# Patient Record
Sex: MALE | Race: WHITE | NOT HISPANIC OR LATINO | Employment: FULL TIME | URBAN - METROPOLITAN AREA
[De-identification: names, ages, dates, MRNs, and addresses within clinical notes are randomized per-mention and may not be internally consistent; named-entity substitution may affect disease eponyms.]

---

## 2019-03-09 ENCOUNTER — APPOINTMENT (OUTPATIENT)
Dept: CT IMAGING | Facility: CLINIC | Age: 39
End: 2019-03-09
Attending: EMERGENCY MEDICINE
Payer: COMMERCIAL

## 2019-03-09 ENCOUNTER — HOSPITAL ENCOUNTER (INPATIENT)
Facility: CLINIC | Age: 39
LOS: 2 days | Discharge: HOME HEALTH | End: 2019-03-11
Attending: EMERGENCY MEDICINE | Admitting: OTOLARYNGOLOGY
Payer: COMMERCIAL

## 2019-03-09 DIAGNOSIS — H70.91 MASTOIDITIS OF RIGHT SIDE: ICD-10-CM

## 2019-03-09 DIAGNOSIS — G51.0 BELL'S PALSY: ICD-10-CM

## 2019-03-09 LAB
ANION GAP SERPL CALCULATED.3IONS-SCNC: 8 MMOL/L (ref 3–14)
BASOPHILS # BLD AUTO: 0 10E9/L (ref 0–0.2)
BASOPHILS NFR BLD AUTO: 0.6 %
BUN SERPL-MCNC: 13 MG/DL (ref 7–30)
CALCIUM SERPL-MCNC: 8.7 MG/DL (ref 8.5–10.1)
CHLORIDE SERPL-SCNC: 107 MMOL/L (ref 94–109)
CO2 SERPL-SCNC: 26 MMOL/L (ref 20–32)
CREAT SERPL-MCNC: 0.92 MG/DL (ref 0.66–1.25)
DIFFERENTIAL METHOD BLD: NORMAL
EOSINOPHIL # BLD AUTO: 0.4 10E9/L (ref 0–0.7)
EOSINOPHIL NFR BLD AUTO: 6 %
ERYTHROCYTE [DISTWIDTH] IN BLOOD BY AUTOMATED COUNT: 12.3 % (ref 10–15)
GFR SERPL CREATININE-BSD FRML MDRD: >90 ML/MIN/{1.73_M2}
GLUCOSE SERPL-MCNC: 100 MG/DL (ref 70–99)
GRAM STN SPEC: ABNORMAL
GRAM STN SPEC: ABNORMAL
HCT VFR BLD AUTO: 44.4 % (ref 40–53)
HGB BLD-MCNC: 15.2 G/DL (ref 13.3–17.7)
IMM GRANULOCYTES # BLD: 0 10E9/L (ref 0–0.4)
IMM GRANULOCYTES NFR BLD: 0.3 %
LYMPHOCYTES # BLD AUTO: 2.4 10E9/L (ref 0.8–5.3)
LYMPHOCYTES NFR BLD AUTO: 34.6 %
MCH RBC QN AUTO: 30.2 PG (ref 26.5–33)
MCHC RBC AUTO-ENTMCNC: 34.2 G/DL (ref 31.5–36.5)
MCV RBC AUTO: 88 FL (ref 78–100)
MONOCYTES # BLD AUTO: 0.6 10E9/L (ref 0–1.3)
MONOCYTES NFR BLD AUTO: 8.9 %
NEUTROPHILS # BLD AUTO: 3.4 10E9/L (ref 1.6–8.3)
NEUTROPHILS NFR BLD AUTO: 49.6 %
NRBC # BLD AUTO: 0 10*3/UL
NRBC BLD AUTO-RTO: 0 /100
PLATELET # BLD AUTO: 361 10E9/L (ref 150–450)
POTASSIUM SERPL-SCNC: 3.9 MMOL/L (ref 3.4–5.3)
RBC # BLD AUTO: 5.03 10E12/L (ref 4.4–5.9)
SODIUM SERPL-SCNC: 141 MMOL/L (ref 133–144)
SPECIMEN SOURCE: ABNORMAL
WBC # BLD AUTO: 6.9 10E9/L (ref 4–11)

## 2019-03-09 PROCEDURE — 99285 EMERGENCY DEPT VISIT HI MDM: CPT | Mod: Z6 | Performed by: EMERGENCY MEDICINE

## 2019-03-09 PROCEDURE — 87070 CULTURE OTHR SPECIMN AEROBIC: CPT | Performed by: STUDENT IN AN ORGANIZED HEALTH CARE EDUCATION/TRAINING PROGRAM

## 2019-03-09 PROCEDURE — 25000125 ZZHC RX 250: Performed by: STUDENT IN AN ORGANIZED HEALTH CARE EDUCATION/TRAINING PROGRAM

## 2019-03-09 PROCEDURE — 25000132 ZZH RX MED GY IP 250 OP 250 PS 637: Performed by: STUDENT IN AN ORGANIZED HEALTH CARE EDUCATION/TRAINING PROGRAM

## 2019-03-09 PROCEDURE — 96375 TX/PRO/DX INJ NEW DRUG ADDON: CPT | Performed by: EMERGENCY MEDICINE

## 2019-03-09 PROCEDURE — 85025 COMPLETE CBC W/AUTO DIFF WBC: CPT | Performed by: EMERGENCY MEDICINE

## 2019-03-09 PROCEDURE — 87106 FUNGI IDENTIFICATION YEAST: CPT | Performed by: STUDENT IN AN ORGANIZED HEALTH CARE EDUCATION/TRAINING PROGRAM

## 2019-03-09 PROCEDURE — 86140 C-REACTIVE PROTEIN: CPT | Performed by: EMERGENCY MEDICINE

## 2019-03-09 PROCEDURE — 99285 EMERGENCY DEPT VISIT HI MDM: CPT | Mod: 25 | Performed by: EMERGENCY MEDICINE

## 2019-03-09 PROCEDURE — 96365 THER/PROPH/DIAG IV INF INIT: CPT | Mod: 59 | Performed by: EMERGENCY MEDICINE

## 2019-03-09 PROCEDURE — 87077 CULTURE AEROBIC IDENTIFY: CPT | Performed by: STUDENT IN AN ORGANIZED HEALTH CARE EDUCATION/TRAINING PROGRAM

## 2019-03-09 PROCEDURE — 25000128 H RX IP 250 OP 636: Performed by: STUDENT IN AN ORGANIZED HEALTH CARE EDUCATION/TRAINING PROGRAM

## 2019-03-09 PROCEDURE — 12000001 ZZH R&B MED SURG/OB UMMC

## 2019-03-09 PROCEDURE — 25800030 ZZH RX IP 258 OP 636: Performed by: EMERGENCY MEDICINE

## 2019-03-09 PROCEDURE — 87075 CULTR BACTERIA EXCEPT BLOOD: CPT | Performed by: STUDENT IN AN ORGANIZED HEALTH CARE EDUCATION/TRAINING PROGRAM

## 2019-03-09 PROCEDURE — 87205 SMEAR GRAM STAIN: CPT | Performed by: STUDENT IN AN ORGANIZED HEALTH CARE EDUCATION/TRAINING PROGRAM

## 2019-03-09 PROCEDURE — 87186 SC STD MICRODIL/AGAR DIL: CPT | Performed by: STUDENT IN AN ORGANIZED HEALTH CARE EDUCATION/TRAINING PROGRAM

## 2019-03-09 PROCEDURE — 70482 CT ORBIT/EAR/FOSSA W/O&W/DYE: CPT

## 2019-03-09 PROCEDURE — 25000128 H RX IP 250 OP 636: Performed by: EMERGENCY MEDICINE

## 2019-03-09 PROCEDURE — 80048 BASIC METABOLIC PNL TOTAL CA: CPT | Performed by: EMERGENCY MEDICINE

## 2019-03-09 PROCEDURE — 87102 FUNGUS ISOLATION CULTURE: CPT | Performed by: STUDENT IN AN ORGANIZED HEALTH CARE EDUCATION/TRAINING PROGRAM

## 2019-03-09 PROCEDURE — 70450 CT HEAD/BRAIN W/O DYE: CPT

## 2019-03-09 RX ORDER — LIDOCAINE 40 MG/G
CREAM TOPICAL
Status: DISCONTINUED | OUTPATIENT
Start: 2019-03-09 | End: 2019-03-11 | Stop reason: HOSPADM

## 2019-03-09 RX ORDER — ACETAMINOPHEN 325 MG/1
975 TABLET ORAL EVERY 8 HOURS
Status: DISCONTINUED | OUTPATIENT
Start: 2019-03-09 | End: 2019-03-11 | Stop reason: HOSPADM

## 2019-03-09 RX ORDER — PREDNISONE 20 MG/1
60 TABLET ORAL EVERY MORNING
Status: DISCONTINUED | OUTPATIENT
Start: 2019-03-10 | End: 2019-03-11 | Stop reason: HOSPADM

## 2019-03-09 RX ORDER — ONDANSETRON 4 MG/1
4 TABLET, ORALLY DISINTEGRATING ORAL EVERY 6 HOURS PRN
Status: DISCONTINUED | OUTPATIENT
Start: 2019-03-09 | End: 2019-03-11 | Stop reason: HOSPADM

## 2019-03-09 RX ORDER — IBUPROFEN 600 MG/1
600 TABLET, FILM COATED ORAL EVERY 6 HOURS PRN
Status: ON HOLD | COMMUNITY
End: 2019-03-11

## 2019-03-09 RX ORDER — CEFTRIAXONE 1 G/1
1 INJECTION, POWDER, FOR SOLUTION INTRAMUSCULAR; INTRAVENOUS EVERY 24 HOURS
Status: DISCONTINUED | OUTPATIENT
Start: 2019-03-10 | End: 2019-03-11

## 2019-03-09 RX ORDER — IOPAMIDOL 755 MG/ML
75 INJECTION, SOLUTION INTRAVASCULAR ONCE
Status: COMPLETED | OUTPATIENT
Start: 2019-03-09 | End: 2019-03-09

## 2019-03-09 RX ORDER — IBUPROFEN 200 MG
400 TABLET ORAL EVERY 6 HOURS PRN
Status: DISCONTINUED | OUTPATIENT
Start: 2019-03-09 | End: 2019-03-11 | Stop reason: HOSPADM

## 2019-03-09 RX ORDER — ACETAMINOPHEN 325 MG/1
650 TABLET ORAL EVERY 4 HOURS PRN
Status: DISCONTINUED | OUTPATIENT
Start: 2019-03-12 | End: 2019-03-11 | Stop reason: HOSPADM

## 2019-03-09 RX ORDER — AMOXICILLIN 250 MG
2 CAPSULE ORAL 2 TIMES DAILY
Status: DISCONTINUED | OUTPATIENT
Start: 2019-03-09 | End: 2019-03-11 | Stop reason: HOSPADM

## 2019-03-09 RX ORDER — HYDROMORPHONE HYDROCHLORIDE 1 MG/ML
.3-.5 INJECTION, SOLUTION INTRAMUSCULAR; INTRAVENOUS; SUBCUTANEOUS
Status: DISCONTINUED | OUTPATIENT
Start: 2019-03-09 | End: 2019-03-10

## 2019-03-09 RX ORDER — CEFDINIR 300 MG/1
300 CAPSULE ORAL 2 TIMES DAILY
Status: ON HOLD | COMMUNITY
End: 2019-03-11

## 2019-03-09 RX ORDER — NALOXONE HYDROCHLORIDE 0.4 MG/ML
.1-.4 INJECTION, SOLUTION INTRAMUSCULAR; INTRAVENOUS; SUBCUTANEOUS
Status: DISCONTINUED | OUTPATIENT
Start: 2019-03-09 | End: 2019-03-11 | Stop reason: HOSPADM

## 2019-03-09 RX ORDER — SODIUM CHLORIDE, SODIUM LACTATE, POTASSIUM CHLORIDE, CALCIUM CHLORIDE 600; 310; 30; 20 MG/100ML; MG/100ML; MG/100ML; MG/100ML
INJECTION, SOLUTION INTRAVENOUS CONTINUOUS
Status: DISCONTINUED | OUTPATIENT
Start: 2019-03-09 | End: 2019-03-11 | Stop reason: HOSPADM

## 2019-03-09 RX ORDER — SODIUM CHLORIDE 9 MG/ML
INJECTION, SOLUTION INTRAVENOUS CONTINUOUS
Status: DISCONTINUED | OUTPATIENT
Start: 2019-03-09 | End: 2019-03-11 | Stop reason: HOSPADM

## 2019-03-09 RX ORDER — CEFTRIAXONE 1 G/1
1 INJECTION, POWDER, FOR SOLUTION INTRAMUSCULAR; INTRAVENOUS ONCE
Status: COMPLETED | OUTPATIENT
Start: 2019-03-09 | End: 2019-03-09

## 2019-03-09 RX ORDER — CIPROFLOXACIN AND DEXAMETHASONE 3; 1 MG/ML; MG/ML
4 SUSPENSION/ DROPS AURICULAR (OTIC) 3 TIMES DAILY
Status: DISCONTINUED | OUTPATIENT
Start: 2019-03-10 | End: 2019-03-11 | Stop reason: HOSPADM

## 2019-03-09 RX ORDER — ONDANSETRON 2 MG/ML
4 INJECTION INTRAMUSCULAR; INTRAVENOUS EVERY 6 HOURS PRN
Status: DISCONTINUED | OUTPATIENT
Start: 2019-03-09 | End: 2019-03-11 | Stop reason: HOSPADM

## 2019-03-09 RX ORDER — AMOXICILLIN 250 MG
1 CAPSULE ORAL 2 TIMES DAILY
Status: DISCONTINUED | OUTPATIENT
Start: 2019-03-09 | End: 2019-03-11 | Stop reason: HOSPADM

## 2019-03-09 RX ORDER — OXYCODONE HYDROCHLORIDE 5 MG/1
5-10 TABLET ORAL
Status: DISCONTINUED | OUTPATIENT
Start: 2019-03-09 | End: 2019-03-11 | Stop reason: HOSPADM

## 2019-03-09 RX ADMIN — CEFTRIAXONE 1 G: 1 INJECTION, POWDER, FOR SOLUTION INTRAMUSCULAR; INTRAVENOUS at 21:35

## 2019-03-09 RX ADMIN — PHENYLEPHRINE HYDROCHLORIDE 5 ML: 10 INJECTION INTRAVENOUS at 20:17

## 2019-03-09 RX ADMIN — RANITIDINE 150 MG: 150 TABLET ORAL at 23:52

## 2019-03-09 RX ADMIN — SODIUM CHLORIDE, PRESERVATIVE FREE 90 ML: 5 INJECTION INTRAVENOUS at 20:28

## 2019-03-09 RX ADMIN — IOPAMIDOL 75 ML: 755 INJECTION, SOLUTION INTRAVENOUS at 20:27

## 2019-03-09 RX ADMIN — VANCOMYCIN HYDROCHLORIDE 2250 MG: 1 INJECTION, POWDER, LYOPHILIZED, FOR SOLUTION INTRAVENOUS at 21:53

## 2019-03-09 SDOH — HEALTH STABILITY: MENTAL HEALTH: HOW OFTEN DO YOU HAVE A DRINK CONTAINING ALCOHOL?: NEVER

## 2019-03-09 ASSESSMENT — ENCOUNTER SYMPTOMS
NUMBNESS: 1
HEADACHES: 1

## 2019-03-09 NOTE — ED PROVIDER NOTES
History     Chief Complaint   Patient presents with     Facial Droop     The history is provided by the patient and medical records.     Reuben Wilder is a 38 year old male who presents to the Emergency Department for evaluation of right-sided facial droop.     Per chart review, patient was seen at Urgent Care on 2/27/19 with complaint of intense right ear pain. He was diagnosed with acute suppurative otitis media of right ear without spontaneous rupture of tympanic membrane and was treated with a 10 day course of amoxicillin. He was seen at Urgent Care again on 3/6/19 for right-sided facial numbness, with improved right ear pain. His prescribed amoxicillin was changed to Omnicef with ipratropium nasal spray.     Here, patient reports that on 2/26/19, he experienced an onset right ear pressure. He visited  on 2/27/19 for evaluation and was treated with amoxacillin and his right ear pain slightly subsided. On 3/6/19, he notice right facial drooping that he describes as feeling like a Novocain shot, so he visited  for evaluation. He notes that his medication was changed to a nasal spray. Yesterday, he reports that his right-sided facial drooping was worsening and today was acutely worse. He notes that he called for an appointment, but the date was too far out. He then called an ENT specialist for an appointment and was instructed to visit the ED. Here, he currently notes of right-sided facial drooping, notably right mouth and eye, sharp intermittent right-sided headache, minor right eye dryness, and right-sided tongue numbness. He notes that he has not taken pain medication. He notes that he had an oral thrush a few years ago with similar mouth dryness and minor loss of taste buds. He denies ear drainage; however, reports when he first experienced ear pain, had slight drainage. He denies a history of ear problems; however, had ear infection and swimmer's ear as a child. He reports a history of strep infection. He  denies taking other medications.     History reviewed. No pertinent past medical history.    History reviewed. No pertinent surgical history.    History reviewed. No pertinent family history.    Social History     Tobacco Use     Smoking status: Never Smoker     Smokeless tobacco: Never Used   Substance Use Topics     Alcohol use: No     Frequency: Never       Current Facility-Administered Medications   Medication     cefTRIAXone (ROCEPHIN) 1 g vial to attach to  mL bag for ADULTS or NS 50 mL bag for PEDS     vancomycin (VANCOCIN) 2,250 mg in sodium chloride 0.9 % 500 mL intermittent infusion     [START ON 3/10/2019] vancomycin 1500 mg in 0.9% NaCl 250 ml intermittent infusion 1,500 mg     Current Outpatient Medications   Medication     cefdinir (OMNICEF) 300 MG capsule     ibuprofen (ADVIL/MOTRIN) 600 MG tablet        Allergies   Allergen Reactions     No Known Allergies        I have reviewed the Medications, Allergies, Past Medical and Surgical History, and Social History in the Epic system.    Review of Systems   HENT: Positive for ear pain (Right-sided).    Eyes:        Positive for minor right eye dryness   Neurological: Positive for numbness (Right-sided tongue) and headaches (Sharp, intermittent, right-sided).        Positive for right-sided facial drooping; notably on right side of mouth and eyes   All other systems reviewed and are negative.      Physical Exam   BP: 133/78  Pulse: 99  Temp: 97.8  F (36.6  C)  Resp: 16  Weight: 106 kg (233 lb 11 oz)  SpO2: 95 %      Physical Exam   Gen:A&Ox3, no acute distress  HEENT:PERRL, Left TM clear. Right TM erythematous, not bulging, fluid behind TM. External canal without drainage. TM without perforation. No pain with movement of the pinna. Mastoid without swelling or tenderness. Oropharynx clear.   Neck:no bony tenderness or step offs, no JVD, trachea midline, no bruits, no masses or adenopathy noted.   CV:RRR without murmurs  PULM:Clear to auscultation  bilaterally  Abd:soft, nontender, nondistended. Bowel sounds present and normal  UE:No traumatic injuries, skin normal  LE:no traumatic injuries, skin normal  Neuro:Right facial droop affecting forehead, right eye lip and lower face. Strength 5/5 of flexion and extension of the toes, ankles, knees, hips, hands, wrists, elbows and shoulders.  Sensation intact to touch throughout. Coordination normal on finger to nose testing. Reflexes 3/6 and symmetric throughout. No clonus. Gait normal. Able to walk on heels and toes.  Negative Romberg sign.   Skin: no rashes or ecchymoses      ED Course   4:58 PM  The patient was seen and examined by Pallavi Trinh MD in [facility].        Procedures    Critical Care time:  none  Labs Ordered and Resulted from Time of ED Arrival Up to the Time of Departure from the ED   BASIC METABOLIC PANEL - Abnormal; Notable for the following components:       Result Value    Glucose 100 (*)     All other components within normal limits   CBC WITH PLATELETS DIFFERENTIAL   PERIPHERAL IV CATHETER   ANAEROBIC BACTERIAL CULTURE   GRAM STAIN   FLUID CULTURE AEROBIC BACTERIAL   FUNGUS CULTURE            Assessments & Plan (with Medical Decision Making)   Reuben Wilder is a healthy 38-year-old male who is presenting with persistent right ear infection now associated with a new right Bell's palsy.    Concern for possible mastoiditis or other any ear temporal bone abnormalities.    ENT consulted and evaluated the patient in the ED.    CT head shows opacified mastoids on the right   ENT recommended a dedicated temporal bone CT with contrast and this shows possibility of bony dehiscence around the facial nerve with concern for infection causing his 7th nerve palsy.    Started on a course of IV ceftriaxone and vancomycin.   ENT urgently placed to put tympanostomy tubes to culture and drain inner ear contents and he was admitted to the ENT service for further care.    In addition laboratory testing included a CBC  and basic metabolic panel which were within normal limits.    This part of the document was transcribed by Shelley Chakraborty Medical Scribe.      I have reviewed the nursing notes.    I have reviewed the findings, diagnosis, plan and need for follow up with the patient.       Medication List      There are no discharge medications for this visit.         Final diagnoses:   Mastoiditis of right side   Bell's palsy     I, Maria Alejandra Goel, am serving as a trained medical scribe to document services personally performed by Pallavi Trinh MD, based on the provider's statements to me.      I, Pallavi Trinh MD, was physically present and have reviewed and verified the accuracy of this note documented by Maria Alejandra Goel.     3/9/2019   [facility], [place], EMERGENCY DEPARTMENT    MD ASHLEY Seymour Katrina Anne, MD  03/10/19 0017

## 2019-03-09 NOTE — ED TRIAGE NOTES
Pt presents ambulatory to triage from home. Pt states for past 1.5 weeks has had right ear pain. Pt has been seen x 2 for issue and given amoxicillin to tx, then switched to cefdinir to tx this past wednesday. This past wednesday Pt began developing right sided facial weakness and numbness. Pt has noted facial droop. No arm drift, no changes in vision, no other weakness or numbness noted.

## 2019-03-10 PROCEDURE — 25000125 ZZHC RX 250: Performed by: STUDENT IN AN ORGANIZED HEALTH CARE EDUCATION/TRAINING PROGRAM

## 2019-03-10 PROCEDURE — 40000141 ZZH STATISTIC PERIPHERAL IV START W/O US GUIDANCE

## 2019-03-10 PROCEDURE — 25000132 ZZH RX MED GY IP 250 OP 250 PS 637: Performed by: STUDENT IN AN ORGANIZED HEALTH CARE EDUCATION/TRAINING PROGRAM

## 2019-03-10 PROCEDURE — 25800030 ZZH RX IP 258 OP 636

## 2019-03-10 PROCEDURE — 25000128 H RX IP 250 OP 636

## 2019-03-10 PROCEDURE — 12000001 ZZH R&B MED SURG/OB UMMC

## 2019-03-10 PROCEDURE — 25000128 H RX IP 250 OP 636: Performed by: STUDENT IN AN ORGANIZED HEALTH CARE EDUCATION/TRAINING PROGRAM

## 2019-03-10 RX ADMIN — SENNOSIDES AND DOCUSATE SODIUM 1 TABLET: 8.6; 5 TABLET ORAL at 20:43

## 2019-03-10 RX ADMIN — CIPROFLOXACIN AND DEXAMETHASONE 4 DROP: 3; 1 SUSPENSION/ DROPS AURICULAR (OTIC) at 14:06

## 2019-03-10 RX ADMIN — DEXTRAN 70, AND HYPROMELLOSE 2910 2 DROP: 1; 3 SOLUTION/ DROPS OPHTHALMIC at 18:19

## 2019-03-10 RX ADMIN — VANCOMYCIN HYDROCHLORIDE 2500 MG: 10 INJECTION, POWDER, LYOPHILIZED, FOR SOLUTION INTRAVENOUS at 23:29

## 2019-03-10 RX ADMIN — DEXTRAN 70, AND HYPROMELLOSE 2910 2 DROP: 1; 3 SOLUTION/ DROPS OPHTHALMIC at 16:12

## 2019-03-10 RX ADMIN — ACETAMINOPHEN 975 MG: 325 TABLET, FILM COATED ORAL at 20:43

## 2019-03-10 RX ADMIN — PREDNISONE 60 MG: 20 TABLET ORAL at 07:36

## 2019-03-10 RX ADMIN — DEXTRAN 70, AND HYPROMELLOSE 2910 2 DROP: 1; 3 SOLUTION/ DROPS OPHTHALMIC at 20:44

## 2019-03-10 RX ADMIN — ACETAMINOPHEN 975 MG: 325 TABLET, FILM COATED ORAL at 10:29

## 2019-03-10 RX ADMIN — Medication: at 21:55

## 2019-03-10 RX ADMIN — DEXTRAN 70, AND HYPROMELLOSE 2910 2 DROP: 1; 3 SOLUTION/ DROPS OPHTHALMIC at 14:06

## 2019-03-10 RX ADMIN — CIPROFLOXACIN AND DEXAMETHASONE 4 DROP: 3; 1 SUSPENSION/ DROPS AURICULAR (OTIC) at 20:44

## 2019-03-10 RX ADMIN — DEXTRAN 70, AND HYPROMELLOSE 2910 2 DROP: 1; 3 SOLUTION/ DROPS OPHTHALMIC at 21:55

## 2019-03-10 RX ADMIN — CIPROFLOXACIN AND DEXAMETHASONE 4 DROP: 3; 1 SUSPENSION/ DROPS AURICULAR (OTIC) at 07:43

## 2019-03-10 RX ADMIN — CEFTRIAXONE 1 G: 1 INJECTION, POWDER, FOR SOLUTION INTRAMUSCULAR; INTRAVENOUS at 21:55

## 2019-03-10 RX ADMIN — RANITIDINE 150 MG: 150 TABLET ORAL at 20:43

## 2019-03-10 RX ADMIN — RANITIDINE 150 MG: 150 TABLET ORAL at 07:36

## 2019-03-10 RX ADMIN — DEXTRAN 70, AND HYPROMELLOSE 2910 2 DROP: 1; 3 SOLUTION/ DROPS OPHTHALMIC at 11:47

## 2019-03-10 ASSESSMENT — ACTIVITIES OF DAILY LIVING (ADL)
ADLS_ACUITY_SCORE: 10
ADLS_ACUITY_SCORE: 10
ADLS_ACUITY_SCORE: 13
ADLS_ACUITY_SCORE: 10
ADLS_ACUITY_SCORE: 10
ADLS_ACUITY_SCORE: 12

## 2019-03-10 NOTE — PLAN OF CARE
Status: Admitted for right otomastoiditis, now with 3 day history of progressive right facial nerve paresis. PE tube placed into right ear.  VS: VSS  Neuros: Slight right facial droop, numbness to right side of face and slight decrease hearing in right ear  GI:  tolerating regular diet  : Voiding spont.?  IV: PIV SL- next IV abx at 2100  Activity: Up independently in room and halls  Pain: Mild pain in right ear- managed with scheduled tylenol.   Respiratory/Trach: Lung sounds clear  Skin: Small amout of serous drainage from right ear- MD notified today otherwise skin intact  Social: Spouse and children visted today  Plan of care: Continues with IV antibiotics and ID consult

## 2019-03-10 NOTE — PLAN OF CARE
Status: pt on 6A after presenting to ED w/ 1.5 wk hx of R ear pain & infection not responding to oral abx. & progressive R facial weakness, droop, & numbness. PE ear tube placement.   VS: VSS on RA  Neuros: slight R facial droop. Denies N/T. Slight diminished hearing in R ear. Otherwise intact   GI: Reg diet. No BM over shift. Passing flatus.   : Voiding spon. ?  IV: PIV SL  Activity: up independently   Pain: intermittent headache & R ear discomfort. Declined interventions.   Respiratory/Trach: LS clear.  Skin: Small serous drainage from R ear.    Plan of care: Pt to start IV abx. Continue to monitor & follow POC.

## 2019-03-10 NOTE — PLAN OF CARE
Pt w/ R sied facial weakness arrived to 6A at 2200. VSS on RA. A&O, up ind. Provided water, no further requests. Continue with POC.

## 2019-03-10 NOTE — PHARMACY-VANCOMYCIN DOSING SERVICE
Pharmacy Vancomycin Initial Note  Date of Service 2019  Patient's  1980  38 year old, male    Indication: Mastoiditis    Current estimated CrCl = CrCl cannot be calculated (Unknown ideal weight.).    Creatinine for last 3 days  3/9/2019:  6:32 PM Creatinine 0.92 mg/dL    Recent Vancomycin Level(s) for last 3 days  No results found for requested labs within last 72 hours.      Vancomycin IV Administrations (past 72 hours)      No vancomycin orders with administrations in past 72 hours.                Nephrotoxins and other renal medications (From now, onward)    Start     Dose/Rate Route Frequency Ordered Stop    03/10/19 1000  vancomycin 1500 mg in 0.9% NaCl 250 ml intermittent infusion 1,500 mg      1,500 mg  over 90 Minutes Intravenous EVERY 12 HOURS 19  vancomycin (VANCOCIN) 2,250 mg in sodium chloride 0.9 % 500 mL intermittent infusion      2,250 mg  over 2 Hours Intravenous ONCE 19            Contrast Orders - past 72 hours (72h ago, onward)    Start     Dose/Rate Route Frequency Ordered Stop    19  iopamidol (ISOVUE-370) solution 75 mL      75 mL Intravenous ONCE 19                Plan:  1.  Start vancomycin  2250 mg (~21 mg/kg), followed by 1500 mg IV q12h (15 mg/kg).   2.  Goal Trough Level: 10-15 mg/L   3.  Pharmacy will check trough levels as appropriate in 1-3 Days.    4. Serum creatinine levels will be ordered daily for the first week of therapy and at least twice weekly for subsequent weeks.    5. [place] method utilized to dose vancomycin therapy: Method 2    Finesse Cruz, Pharm.D.

## 2019-03-10 NOTE — CONSULTS
Otolaryngology Consult Note  March 9, 2019      CC: facial weakness    ENT was asked to see this patient regarding facial weakness by Dr. Trinh in the ED    HPI: Reuben Wilder is an otherwise healthy 38 year old male with an 11 day history of aural fullness and intermittent pain, and a 4 day history of progressive right facial weakness.   Patient reports that he first noticed otalgia, aural fullness, tinnitus, and decreased on the night of 2/26. He went to a local urgent care the next day where he was diagnosed with an ear infection and given amoxicillin. He reports the otalgia went away shortly after starting the antibiotics, but the aural fullness and decreased hearing has remained. The first day of the ear infection he had a fever, but after that he did not have a fever, chills, myalgias. He denies neck stiffness, mental cloudiness, confusion, vertigo, change in vision.   This past Wednesday 3/6, he noticed some odd sensations around his ear and expanding onto his right face. He then noticed that he had some weakness of the right corner of his mouth. He went back to urgent care and his amoxicillin was switched to cefdinir and he was given a nasal spray. These did not seem to help his face, and his face got weaker over the next two days. Now he notices his forehead and nose don't move as much on the right side. His tongue also feels weird on that side, but he has not bit it.     He otherwise denies any previous ear history, no history of ear surgeries or tubes.    He is not a smoker.    History reviewed. No pertinent past medical history.    History reviewed. No pertinent surgical history.    No current outpatient medications on file.          Allergies   Allergen Reactions     No Known Allergies        Social History     Socioeconomic History     Marital status: Single     Spouse name: Not on file     Number of children: Not on file     Years of education: Not on file     Highest education level: Not on file    Occupational History     Not on file   Social Needs     Financial resource strain: Not on file     Food insecurity:     Worry: Not on file     Inability: Not on file     Transportation needs:     Medical: Not on file     Non-medical: Not on file   Tobacco Use     Smoking status: Never Smoker     Smokeless tobacco: Never Used   Substance and Sexual Activity     Alcohol use: No     Frequency: Never     Drug use: No     Sexual activity: No   Lifestyle     Physical activity:     Days per week: Not on file     Minutes per session: Not on file     Stress: Not on file   Relationships     Social connections:     Talks on phone: Not on file     Gets together: Not on file     Attends Anabaptist service: Not on file     Active member of club or organization: Not on file     Attends meetings of clubs or organizations: Not on file     Relationship status: Not on file     Intimate partner violence:     Fear of current or ex partner: Not on file     Emotionally abused: Not on file     Physically abused: Not on file     Forced sexual activity: Not on file   Other Topics Concern     Not on file   Social History Narrative     Not on file       History reviewed. No pertinent family history.    ROS: 12 point review of systems is negative unless noted in HPI.    PHYSICAL EXAM:  General: Sitting up in chair, no acute distress  /56   Pulse 68   Temp 96.7  F (35.9  C) (Oral)   Resp 16   Wt 106 kg (233 lb 11 oz)   SpO2 96%   HEAD: normocephalic, atraumatic  Face: HB 1/6 on left. Right is HB 3/6- decreased motion of frontalis, full eye closure with effort, but small amount of scleral show with resting eye closure. Nasal movement and cheek movement are severely decreased. Smile is weak on right and pucker is slightly decreased. Sensation V1-V3 intact and equal bilaterally.   Eyes: EOMI without spontaneous or gaze evoked nystagmus, PERRL, clear sclera  Ears: no tragal tenderness, no mastoid tenderness or fluctuance. There is no  erythema around the ear or ear protrusion. External ear canal open and clear bilaterally, left TM is healthy and intact, middle ear space is clear. The right TM is a little hyperemic, and there appears to be a serous effusion behind the drum.  Nose: no anterior drainage, bilateral inferior turbinate crusting, intact and midline septum without perforation or hematoma   Mouth: moist, no ulcers, no jaw or tooth tenderness, tongue protrudes midline and has normal ROM.  Oropharynx: tonsils within normal limits, uvula midline, palate elevates symmetrically, no oropharyngeal erythema  Neck: no LAD, trachea midline  Neuro: cranial nerves 2-12 grossly intact    FIBEROPTIC ENDOSCOPY:  Due to unilateral ear effusion, fiberoptic laryngoscopy was indicated. After obtaining verbal consent, the nose was topically decongested and anesthetized. The fiberoptic laryngoscope was passed under endoscopic vision through bilateral nasal passages. The turbinates had some crusting. The inferior and middle meati were clear bilaterally without purulence, masses, or polyps. The nasopharynx had copious clear secretions. The eustachian tubes were clear. The soft palate appeared normal with good mobility. The epiglottis was sharp, and the visualized portion of the vallecula was clear. The larynx was clear with mobile cords. The arytenoids were clear, and there was no pooling in the hypopharynx.    PE tube placement: Patient signed informed consent and was placed in supine position under otomicroscope. Phenol was applied to the drum, and a myringotomy incision made in the inferior portion of the drum. Serous effusion was suctioned from the middle ear and saved in a luki-trap. A nirmala bobbin tube was then placed with an alligator and straight pick. The patient tolerated the procedure well. Dr Chavez was present for and participated in the entire procedure.    ROUTINE IP LABS (Last four results)  [facility]  Recent Labs   Lab 03/09/19  1832       POTASSIUM 3.9   CHLORIDE 107   ED 8.7   CO2 26   BUN 13   CR 0.92   *     CBC  Recent Labs   Lab 03/09/19  1832   WBC 6.9   RBC 5.03   HGB 15.2   HCT 44.4   MCV 88   MCH 30.2   MCHC 34.2   RDW 12.3        INRNo lab results found in last 7 days.    Imaging:  Results for orders placed or performed during the hospital encounter of 03/09/19   CT Head w/o Contrast    Narrative    CT HEAD W/O CONTRAST 3/9/2019 6:12 PM    Provided History: right ear pain and new bell's palsy, eval for  mastoiditis    Comparison: None available.    Technique: Using multidetector thin collimation helical acquisition  technique, axial, coronal and sagittal CT images from the skull base  to the vertex were obtained without intravenous contrast.     Findings:    No intracranial hemorrhage, mass effect, or midline shift. The  ventricles are proportionate to the cerebral sulci. The gray to white  matter differentiation of the cerebral hemispheres is preserved. The  basal cisterns are patent.    The visualized paranasal sinuses are clear. Near complete  opacification of right mastoid air cells. The left mastoid air cells  are clear.       Impression    Impression:  1.  Near complete opacification of right mastoid air cells, compatible  with mastoiditis.  2.   No acute intracranial pathology.    I have personally reviewed the examination and initial interpretation  and I agree with the findings.    ARTIE RUEDA MD   CT head without contrast obtained this evening shows opacification of the right mastoid air cells and the middle ear space. There is no obvious evidence of bony erosion or coalescent mastoiditis. On the reconstruction of the images for T-bone scan (without contrast) the scutum is sharp on the right, the ossicles are intact, and there again does not appear to be bony erosion to suggest a cholesteatoma.   Temporal bone scan with IV contrast in process  Awaiting final reads.     Assessment and Plan  Reuben Wilder is a  38 year old male with a history of right otitis media, now developing right facial nerve weakness over the past 3 days. He has a serous effusion on the right on exam, likely a sequela of his resolving infection. The current imaging does not appear to show signs of cholesteatoma or other invasive process, but will await final reads. We placed a PE tube and collected some of the middle ear effusion for culture and will place him on antibiotics that have CSF coverage. We will treat the facial nerve paralysis with steroids.     Neuro:  - Pain control: tylenol, ibuprofen, oxy, dilaudid. Melatonin PRN for sleep  HEENT:  - Ciprodex to right ear TID  - IV antibiotics with CSF coverage  - Prednisone 60mg every day for facial nerve paresis.   Respiratory:  - supplemental O2 PRN to keep sats >92%  CV/heme:  - hemodynamically stable  FEN/GI:  - regular diet  - Bowel regimen: senna/docusate  :  - voiding independently  Endo  - No issues  ID:  - Ceftriaxone for complicated otitis media sequelae. CSF coverage, but no risk factors to need Vanc.   - Watch cultures and narrow appropriately  - WBC normal, afebrile  PPX:  - H2 blocker due to steroid use  - SCDs  - IS  Dispo: IV antibiotics while awaiting cultures and sensitivities, observe for improvement in facial nerve paralysis    Chantal Cueva MD PGY3  Otolaryngology-Head & Neck Surgery  Please page ENT with questions by dialing [phone] and entering job code [phone] when prompted.      Teaching statement:  I saw the patient on 3/10/2019. I agree with the resident's note as documented with additional findings as below.   Patient is a 38 year old man with recent history of otitis media, incompletely treated locally and then recently developed an incomplete facial nerve paresis. He was scheduled to see Dr Villalta on Friday 3/15 but was called and instructed to come to the ER for evaluation today due to concerns for a complication from AOM. He says that the facial nerve weakness  started a few days ago, progressive, but still with some movement.     On exam he was in no distress. The right TM is dull, fluid present. On exam there is movement in the frontal branch, complete eye closure but definite weakness around periocular muscles, decreased movement in midface muscles, upper and lower lip asymmetry with decreased movement (some subtle movement present).     He had a CT head which I reviewed the images of showing fluid in the mastoid, but cuts too large to completely evaluate the temporal bone. I recommended a CT temporal bone with contrast which I independently reviewed the images from: fluid in the right mastoid, scutum without erosion, middle ear ossicles without erosion, thin tegmen.     I discussed the patient's case with Dr Villalta our neurotologist - appreciate her assistance.    I was present for and participated in the entire myringotomy and tube placement procedure. Cultures taken from middle ear space and sent for culture.    I discussed with the patient that given this complication from AOM he is indicated for admission for IV antibiotics. Would like to cover for polymicrobial infection, but include strep coverage and provide CSF penetration along with ciprodex for topical treatment. We will provide high dose steroids with 60 mg prednisone to help with the facial nerve recovery. I discussed with the patient that the incomplete paresis does give a calista prognosis than complete paralysis but we still need to monitor things very closely to ensure no other complications of AOM. At this time no indication for MRI brain but will have low threshold pending final read on CT and hospital course.    Brooke Chavez MD    Department of Otolaryngology

## 2019-03-10 NOTE — PROGRESS NOTES
Patient with facial nerve paralysis, concern for acute complication of AOM vs cholesteatoma. Needs to have temporal bone CT with contrast to further evaluate pathology - head CT with too large of cuts to fully evaluate the temporal bone. Head CT does show opacification of the mastoid on the right. Need to be evaluated for other complications of acute otitis media, after T bone CT will need to consider an MRI with contrast. Will need admission for IV antibiotics with CSF penetration and strep coverage, high dose steroids (60 mg prednisone) to monitor the facial nerve paralysis. Needs microscope ear exam tonight and likely myringotomy and tube placement with cultures from middle ear.       Brooke Chavez MD    Department of Otolaryngology

## 2019-03-10 NOTE — PROGRESS NOTES
Otolaryngology Progress Note  March 10, 2019    S: No acute events overnight. Tolerating the IV antibiotics and steroids well.  He feels like his smile is better today but is aware of ongoing facial weakness, taste change.  Does not feel eye is dry or foreign body sensation.    Reports that he had MRSA infection of his left facial skin (boil) last year that he contracted from his daughter.  Also, his daughter had been sick with URI and OM prior to his development of it; she is in  and a school environment.  He had a URI for days before onset of ear pain and pressure.  He had many episodes of OM as a kid; no tubes.    O: /56   Pulse 68   Temp 96.7  F (35.9  C) (Oral)   Resp 16   Wt 106 kg (233 lb 11 oz)   SpO2 96%    General: Alert and oriented x 3, No acute distress   HEENT: EOMI. HB 2/6 on right,1/6 on left. Eye closure is a little better today, closes completely at rest. Right ear exam shows no drainage in canal, patent PE tube, there is some dried yellow drainage on the outside of the ear   Pulmonary: Breathing non-labored, no stridor, no accessory muscle use.    LABS:  ROUTINE IP LABS (Last four results)  BMP  Recent Labs   Lab 03/09/19  1832      POTASSIUM 3.9   CHLORIDE 107   ED 8.7   CO2 26   BUN 13   CR 0.92   *     CBC  Recent Labs   Lab 03/09/19  1832   WBC 6.9   RBC 5.03   HGB 15.2   HCT 44.4   MCV 88   MCH 30.2   MCHC 34.2   RDW 12.3        INRNo lab results found in last 7 days.    Gram stain showing gram positive cocci    Assessment and Plan  Reuben Wilder is a 38 year old male with right otomastoiditis, now with 3 day history of progressive right facial nerve paresis. He had received amoxicillin from OSH; resistant Strep suspected.  He had a cloudy yellow effusion on the right on exam, likely a sequela of his incompletely treated infection. The current imaging does not appear to show signs of cholesteatoma or other invasive process, but will await final reads. We  placed a PE tube and collected the middle ear effusion for culture and will place him on antibiotics that have CSF coverage for now given high rate of co-occurring complications once one complication of otomastoiditis is present. We will also treat the facial nerve paralysis with steroids. Awaiting culture results to narrow antibiotics before sending home and observing facial function for progression that would indicate a need to consider additional surgical intervention.     Neuro:  - Pain control: tylenol, ibuprofen, oxy. Hasn't needed anything yet. Melatonin PRN for sleep  HEENT:  - Ciprodex to right ear TID  - IV antibiotics with CSF coverage  - Prednisone 60mg every day for facial nerve paresis.   - Natural tears and lacrilube to right eye  Respiratory:  - supplemental O2 PRN to keep sats >92%  CV/heme:  - hemodynamically stable  FEN/GI:  - regular diet  - Bowel regimen: senna/docusate  :  - voiding independently  Endo  - No issues  ID:  - Ceftriaxone for complicated otitis media sequelae. CSF coverage, but no risk factors to need Vanc.   - Watch cultures and narrow appropriately; gram stain showing gram positive cocci  - WBC normal, afebrile  PPX:  - H2 blocker due to steroid use  - SCDs  - IS  Dispo: IV antibiotics until cultures available to narrow    Chantal Cueva MD PGY3  Otolaryngology-Head & Neck Surgery  Please contact ENT with questions by dialing [phone] and entering job code [phone] when prompted.    I saw and evaluated the patient. I agree with the findings and the plan of care as documented in the resident s note.    Marlen Villalta MD  Otology & Neurotology  [facility]

## 2019-03-10 NOTE — CONSULTS
[facility] ID Service: Initial Consultation     Patient:  Reuben Wilder, Date of birth 1980, Medical record number [id number]  Date of Visit:  March 10, 2019  Consult Requested by: Dr. Villalta         Assessment and Recommendations:   ID Problem List:  Otomastoiditis following ear infection dx 2/26 s/p 7 days of amoxicillin and 3 days of cefdinir  Facial Nerve palsy on R  CT of temporal bones shows otomastoiditis and tegmentum mastoideum and tympani dehiscence cannot be excluded  Hx MRSA last year near L ear, boil/pimple a few months ago near R ear    Recommendations:  -agree with ceftriaxone IV  -would add IV vancomycin CNS dosing given hx of MRSA and recent boil/pimple by R ear  -consider an LP to help with duration of antibiotics  -monitor cultures     Discussion:  Patient is a 38 year old with no significant pmhx who presents with otomastoiditis following an ear infection and R facial palsy. He has been on IV ceftriaxone and had his middle ear effusion drained and sent for culture with a tube placed. Given hx of MRSA I would add IV vancomycin with CNS dosing. I would continue ceftriaxone. I would get an LP to help decide length of therapy and evaluation for signs of meningitis. There is a high rate of pseudomonas in mastoiditis however he seems to be responding to ceftriaxone and his gram stain shows GPCs so I would not do pseudomonal coverage for now. However low threshold to expand to IV cefepime if worsening symptoms or fevers.     Thank you for this consult. ID will continue to follow along.     I will be off service tomorrow but Dr. Zelda Booth [phone] will be on.     Attestation:  I have reviewed today's vital signs, medications, labs and imaging.  Amanda Cantu MD  Pager [phone]         History of Present Illness:       Patient is a 38 year old without much pmhx who presents with facial nerve palsy following an ear infection with concern for otomastoiditis and concern for CNS involvement  on CT. He reports he was in his usual state of health until about 10 days ago when he developed a fever and ear pain and was found to have an ear infection. He was placed on amoxicillin for a week which he reports finishing and then cefdinir for 3 days. However he then developed a R facial palsy with R facial paralysis and difficulty closing his R eye completely. He went to urgent care and was sent to the ED. There ENT saw him and placed him on IV ceftriaxone for otomastoiditis and he was admitted. His temporal bone CT showed otomastoiditis and tegmentum mastoideum and tympani dehiscence cannot be excluded.    Here he was found to have a R effusion from his ear which was drained with a tube placed 3/9 which he reports helping. Cultures were sent and are pending. He thinks his facial palsy is slightly improved since admission. He has some ear pain which is improved. Fever only on 2/26 but none since starting antibiotics. No visual changes although had acquired glasses for driving recently. No visual blurring. He reports he has a 5 year old daughter who had the flu and an ear infection shortly before he became ill. She is in .     He was diagnosed with MRSA last year after he had a boil near his L ear. He reports a few months ago he had a boil/pimple by his R ear which he popped and it went away.            Review of Systems:   Full 12 point ROS obtained, pertinent positives and negatives as above.       Past Medical History:   History reviewed. No pertinent past medical history.  History reviewed. No pertinent surgical history.  He is usually on no medication and last night was the first time he remembers being admitted to the hospital.       Allergies:      Allergies   Allergen Reactions     No Known Allergies             Current Antimicrobials:     Ceftriaxone 3/9-       Family History:   History reviewed. No pertinent family history.  No DM, no htn       Social History:     Social History     Socioeconomic  History     Marital status: Single     Spouse name: Not on file     Number of children: Not on file     Years of education: Not on file     Highest education level: Not on file   Occupational History     Not on file   Social Needs     Financial resource strain: Not on file     Food insecurity:     Worry: Not on file     Inability: Not on file     Transportation needs:     Medical: Not on file     Non-medical: Not on file   Tobacco Use     Smoking status: Never Smoker     Smokeless tobacco: Never Used   Substance and Sexual Activity     Alcohol use: No     Frequency: Never     Drug use: No     Sexual activity: No   Lifestyle     Physical activity:     Days per week: Not on file     Minutes per session: Not on file     Stress: Not on file   Relationships     Social connections:     Talks on phone: Not on file     Gets together: Not on file     Attends Jain service: Not on file     Active member of club or organization: Not on file     Attends meetings of clubs or organizations: Not on file     Relationship status: Not on file     Intimate partner violence:     Fear of current or ex partner: Not on file     Emotionally abused: Not on file     Physically abused: Not on file     Forced sexual activity: Not on file   Other Topics Concern     Not on file   Social History Narrative     Not on file              Physical Exam:   Ranges forvital signs:  Temp:  [96.7  F (35.9  C)-98.4  F (36.9  C)] 97  F (36.1  C)  Pulse:  [65-76] 76  Heart Rate:  [72] 72  Resp:  [16-18] 16  BP: (123-136)/(56-85) 136/64  SpO2:  [96 %-98 %] 96 %  No intake or output data in the 24 hours ending 03/10/19 0097    Exam:  GENERAL:  well-developed, well-nourished, sitting in bed in no acute distress. He had just had ear drops placed in R ear so was leaning L.  ENT:  Head is normocephalic, atraumatic. Oropharynx is moist without exudates or ulcers.  EYES:  Eyes have anicteric sclerae.    NECK:  Supple.  LUNGS:  Clear to  auscultation.  CARDIOVASCULAR:  Regular rate and rhythm with no murmurs, gallops or rubs.  ABDOMEN:  Normal bowel sounds, soft, nontender.  EXT: Extremities warm and without edema.  SKIN:  No acute rashes.  Line is in place without any surrounding erythema.  NEUROLOGIC:  Grossly nonfocal.         Laboratory Data:   Reviewed.  Pertinent for:    Culture data:    3/9/19  R middle ear culture  Gram Stain shows rare GPCs  Culture pending         Imaging:     CT HEAD W/O CONTRAST 3/9/2019 6:12 PM     Provided History: right ear pain and new bell's palsy, eval for  mastoiditis     Comparison: None available.     Technique: Using multidetector thin collimation helical acquisition  technique, axial, coronal and sagittal CT images from the skull base  to the vertex were obtained without intravenous contrast.      Findings:    No intracranial hemorrhage, mass effect, or midline shift. The  ventricles are proportionate to the cerebral sulci. The gray to white  matter differentiation of the cerebral hemispheres is preserved. The  basal cisterns are patent.     The visualized paranasal sinuses are clear. Near complete  opacification of right mastoid air cells. The left mastoid air cells  are clear.                                                                      Impression:  1.  Near complete opacification of right mastoid air cells, compatible  with mastoiditis.  2.   No acute intracranial pathology.    CT TEMPORAL BONES WO & W CONTRAST 3/9/2019 8:35 PM     Provided History: ear pain and right facial droop     Comparison: Head CT same day      Technique: Using multidetector thin collimation helical acquisition  technique, axial and coronal thin section CT images were reconstructed  through both temporal bones. Additional reconstructions performed in  the planes of Poschl and Stenver were also obtained. Images were  reviewed in a bone window.     Findings:   Limited evaluation due to reconstruction images.     Right temporal  bone: The mastoid air cells are near complete  opacified. There is no debris in the external auditory canal. The  tympanic membrane is intact. There is fluid or soft tissue thickening  in the right middle ear cavity. The bony ossicles are intact and in  normal alignment. The epitympanum is clear. The bony scutum is sharp.  The internal auditory canal and the labyrinthine structures are within  normal limits. Osseous irregularity along the tegmen, dehiscence  cannot be excluded.     Left temporal bone: The mastoid air cells are clear. There is no  debris in the external auditory canal. The tympanic membrane is  intact. There is no fluid or soft tissue thickening in the left middle  ear cavity. The bony ossicles are intact and in normal alignment. The  epitympanum is clear. The bony scutum is sharp. The internal auditory  canal and the labyrinthine structures are within normal limits. The  facial nerve canal appears normal along its course.                                                                      Impression:    1.  Right otomastoiditis. Tegmentum mastoideum and tympani dehiscence  cannot be excluded.  2.  Left temporal bone is unremarkable.

## 2019-03-10 NOTE — ED NOTES
[facility], [place]   ED Nurse to Floor Handoff     Reuben Wilder is a 38 year old male who speaks English and lives with a spouse,  in a home  They arrived in the ED by car from home    ED Chief Complaint: Facial Droop    ED Dx;   Final diagnoses:   Mastoiditis of right side   Bell's palsy         Needed?: No    Allergies:   Allergies   Allergen Reactions     No Known Allergies    .  Past Medical Hx: History reviewed. No pertinent past medical history.   Baseline Mental status: WDL  Current Mental Status changes: at basesline    Infection present or suspected this encounter: yes other ear  Sepsis suspected: No  Isolation type: No active isolations     Activity level - Baseline/Home:  Independent  Activity Level - Current:   Independent    Bariatric equipment needed?: No    In the ED these meds were given:   Medications   cefTRIAXone (ROCEPHIN) 1 g vial to attach to  mL bag for ADULTS or NS 50 mL bag for PEDS (not administered)   vancomycin (VANCOCIN) 2,250 mg in sodium chloride 0.9 % 500 mL intermittent infusion (not administered)   vancomycin 1500 mg in 0.9% NaCl 250 ml intermittent infusion 1,500 mg (not administered)   lidocaine 3%, phenylephrine 0.25% solution for irrigation (5 mLs Irrigation Given 3/9/19 2017)   iopamidol (ISOVUE-370) solution 75 mL (75 mLs Intravenous Given 3/9/19 2027)   sodium chloride 0.9 % bag 500mL for CT scan flush use (90 mLs Intravenous Given 3/9/19 2028)       Drips running?  No    Home pump  No    Current LDAs  Peripheral IV 03/09/19 Left Hand (Active)   Site Assessment WDL 3/9/2019  6:32 PM   Line Status Saline locked 3/9/2019  6:32 PM   Phlebitis Scale 0-->no symptoms 3/9/2019  6:32 PM   Infiltration Scale 0 3/9/2019  6:32 PM   Number of days: 0       Labs results:   Labs Ordered and Resulted from Time of ED Arrival Up to the Time of Departure from the ED   BASIC METABOLIC PANEL - Abnormal; Notable for the following components:        Result Value    Glucose 100 (*)     All other components within normal limits   CBC WITH PLATELETS DIFFERENTIAL   PERIPHERAL IV CATHETER   ANAEROBIC BACTERIAL CULTURE   GRAM STAIN   FLUID CULTURE AEROBIC BACTERIAL       Imaging Studies:   Recent Results (from the past 24 hour(s))   CT Head w/o Contrast    Narrative    CT HEAD W/O CONTRAST 3/9/2019 6:12 PM    Provided History: right ear pain and new bell's palsy, eval for  mastoiditis    Comparison: None available.    Technique: Using multidetector thin collimation helical acquisition  technique, axial, coronal and sagittal CT images from the skull base  to the vertex were obtained without intravenous contrast.     Findings:    No intracranial hemorrhage, mass effect, or midline shift. The  ventricles are proportionate to the cerebral sulci. The gray to white  matter differentiation of the cerebral hemispheres is preserved. The  basal cisterns are patent.    The visualized paranasal sinuses are clear. Near complete  opacification of right mastoid air cells. The left mastoid air cells  are clear.       Impression    Impression:  1.  Near complete opacification of right mastoid air cells, compatible  with mastoiditis.  2.   No acute intracranial pathology.    I have personally reviewed the examination and initial interpretation  and I agree with the findings.    ARTIE RUEDA MD   CT Temporal Bones wo & w Contrast    Narrative    CT TEMPORAL BONES WO & W CONTRAST 3/9/2019 8:35 PM    Provided History: ear pain and right facial droop     Comparison: Head CT same day     Technique: Using multidetector thin collimation helical acquisition  technique, axial and coronal thin section CT images were reconstructed  through both temporal bones. Additional reconstructions performed in  the planes of Poschl and Stenver were also obtained. Images were  reviewed in a bone window.    Findings:   Limited evaluation due to reconstruction images.    Right temporal bone: The  mastoid air cells are near complete  opacified. There is no debris in the external auditory canal. The  tympanic membrane is intact. There is fluid or soft tissue thickening  in the right middle ear cavity. The bony ossicles are intact and in  normal alignment. The epitympanum is clear. The bony scutum is sharp.  The internal auditory canal and the labyrinthine structures are within  normal limits. Osseous irregularity along the tegmen, dehiscence  cannot be excluded.    Left temporal bone: The mastoid air cells are clear. There is no  debris in the external auditory canal. The tympanic membrane is  intact. There is no fluid or soft tissue thickening in the left middle  ear cavity. The bony ossicles are intact and in normal alignment. The  epitympanum is clear. The bony scutum is sharp. The internal auditory  canal and the labyrinthine structures are within normal limits. The  facial nerve canal appears normal along its course.      Impression    Impression:    1.  Right otomastoiditis. Tegmentum mastoideum and tympani dehiscence  cannot be excluded.  2.  Left temporal bone is unremarkable.    I have personally reviewed the examination and initial interpretation  and I agree with the findings.    ARTIE RUEDA MD       Recent vital signs:   /78   Pulse 99   Temp 97.8  F (36.6  C) (Oral)   Resp 16   Wt 106 kg (233 lb 11 oz)   SpO2 95%     Cardiac Rhythm: Normal Sinus  Pt needs tele? No  Skin/wound Issues: None    Code Status: Full Code    Pain control: fair    Nausea control: pt had none    Abnormal labs/tests/findings requiring intervention: CT    Family present during ED course? No   Family Comments/Social Situation comments: ENT procedure done    Tasks needing completion: IV antibiotics    Shannon Amaya RN    [phone] [facility]

## 2019-03-11 ENCOUNTER — APPOINTMENT (OUTPATIENT)
Dept: GENERAL RADIOLOGY | Facility: CLINIC | Age: 39
End: 2019-03-11
Attending: PHYSICIAN ASSISTANT
Payer: COMMERCIAL

## 2019-03-11 ENCOUNTER — HOME INFUSION (PRE-WILLOW HOME INFUSION) (OUTPATIENT)
Dept: PHARMACY | Facility: CLINIC | Age: 39
End: 2019-03-11

## 2019-03-11 VITALS
BODY MASS INDEX: 32.72 KG/M2 | HEART RATE: 76 BPM | OXYGEN SATURATION: 95 % | SYSTOLIC BLOOD PRESSURE: 131 MMHG | WEIGHT: 233.69 LBS | HEIGHT: 71 IN | DIASTOLIC BLOOD PRESSURE: 75 MMHG | RESPIRATION RATE: 16 BRPM | TEMPERATURE: 97.1 F

## 2019-03-11 LAB
CRP SERPL-MCNC: <2.9 MG/L (ref 0–8)
GLUCOSE BLDC GLUCOMTR-MCNC: 102 MG/DL (ref 70–99)

## 2019-03-11 PROCEDURE — 25800030 ZZH RX IP 258 OP 636

## 2019-03-11 PROCEDURE — 25000125 ZZHC RX 250: Performed by: STUDENT IN AN ORGANIZED HEALTH CARE EDUCATION/TRAINING PROGRAM

## 2019-03-11 PROCEDURE — 25000125 ZZHC RX 250: Performed by: PHYSICIAN ASSISTANT

## 2019-03-11 PROCEDURE — 27211417 ZZ H KIT, VPS RHYTHM STYLET

## 2019-03-11 PROCEDURE — 25000132 ZZH RX MED GY IP 250 OP 250 PS 637: Performed by: STUDENT IN AN ORGANIZED HEALTH CARE EDUCATION/TRAINING PROGRAM

## 2019-03-11 PROCEDURE — C1751 CATH, INF, PER/CENT/MIDLINE: HCPCS

## 2019-03-11 PROCEDURE — 40000986 XR CHEST PORT 1 VW

## 2019-03-11 PROCEDURE — 00000146 ZZHCL STATISTIC GLUCOSE BY METER IP

## 2019-03-11 PROCEDURE — 25000128 H RX IP 250 OP 636

## 2019-03-11 PROCEDURE — 36569 INSJ PICC 5 YR+ W/O IMAGING: CPT

## 2019-03-11 RX ORDER — CIPROFLOXACIN AND DEXAMETHASONE 3; 1 MG/ML; MG/ML
4 SUSPENSION/ DROPS AURICULAR (OTIC) 3 TIMES DAILY
Qty: 8.4 ML | Refills: 0 | Status: SHIPPED | OUTPATIENT
Start: 2019-03-11 | End: 2019-03-25

## 2019-03-11 RX ORDER — LIDOCAINE 40 MG/G
CREAM TOPICAL
Status: DISCONTINUED | OUTPATIENT
Start: 2019-03-11 | End: 2019-03-11 | Stop reason: HOSPADM

## 2019-03-11 RX ORDER — CEFTRIAXONE 1 G/1
2000 INJECTION, POWDER, FOR SOLUTION INTRAMUSCULAR; INTRAVENOUS EVERY 24 HOURS
Qty: 240 ML | Refills: 0
Start: 2019-03-11 | End: 2019-03-11

## 2019-03-11 RX ORDER — PREDNISONE 20 MG/1
60 TABLET ORAL EVERY MORNING
Qty: 36 TABLET | Refills: 0 | Status: SHIPPED | OUTPATIENT
Start: 2019-03-12 | End: 2019-03-24

## 2019-03-11 RX ORDER — CEFTRIAXONE 1 G/1
1 INJECTION, POWDER, FOR SOLUTION INTRAMUSCULAR; INTRAVENOUS EVERY 24 HOURS
Qty: 140 ML | Refills: 0 | Status: SHIPPED | OUTPATIENT
Start: 2019-03-11 | End: 2019-03-11

## 2019-03-11 RX ORDER — IBUPROFEN 400 MG/1
400 TABLET, FILM COATED ORAL EVERY 6 HOURS PRN
Qty: 100 TABLET | Refills: 0 | Status: SHIPPED | OUTPATIENT
Start: 2019-03-11 | End: 2019-11-05

## 2019-03-11 RX ORDER — CEFTRIAXONE 2 G/1
2 INJECTION, POWDER, FOR SOLUTION INTRAMUSCULAR; INTRAVENOUS EVERY 12 HOURS
Status: DISCONTINUED | OUTPATIENT
Start: 2019-03-11 | End: 2019-03-11 | Stop reason: HOSPADM

## 2019-03-11 RX ORDER — HEPARIN SODIUM,PORCINE 10 UNIT/ML
2-5 VIAL (ML) INTRAVENOUS
Status: DISCONTINUED | OUTPATIENT
Start: 2019-03-11 | End: 2019-03-11 | Stop reason: HOSPADM

## 2019-03-11 RX ORDER — CEFTRIAXONE 1 G/1
2000 INJECTION, POWDER, FOR SOLUTION INTRAMUSCULAR; INTRAVENOUS EVERY 12 HOURS
Qty: 480 ML | Refills: 0 | Status: SHIPPED | OUTPATIENT
Start: 2019-03-11 | End: 2019-03-23

## 2019-03-11 RX ORDER — ACETAMINOPHEN 325 MG/1
650 TABLET ORAL EVERY 4 HOURS PRN
Qty: 100 TABLET | Refills: 0 | Status: SHIPPED | OUTPATIENT
Start: 2019-03-12 | End: 2019-11-05

## 2019-03-11 RX ORDER — CEFTRIAXONE 1 G/1
1 INJECTION, POWDER, FOR SOLUTION INTRAMUSCULAR; INTRAVENOUS EVERY 24 HOURS
Qty: 140 ML | Refills: 0
Start: 2019-03-11 | End: 2019-03-11

## 2019-03-11 RX ADMIN — CIPROFLOXACIN AND DEXAMETHASONE 4 DROP: 3; 1 SUSPENSION/ DROPS AURICULAR (OTIC) at 14:32

## 2019-03-11 RX ADMIN — LIDOCAINE HYDROCHLORIDE 2 ML: 10 INJECTION, SOLUTION EPIDURAL; INFILTRATION; INTRACAUDAL; PERINEURAL at 16:40

## 2019-03-11 RX ADMIN — SENNOSIDES AND DOCUSATE SODIUM 1 TABLET: 8.6; 5 TABLET ORAL at 07:38

## 2019-03-11 RX ADMIN — DEXTRAN 70, AND HYPROMELLOSE 2910 2 DROP: 1; 3 SOLUTION/ DROPS OPHTHALMIC at 12:29

## 2019-03-11 RX ADMIN — VANCOMYCIN HYDROCHLORIDE 1750 MG: 1 INJECTION, POWDER, LYOPHILIZED, FOR SOLUTION INTRAVENOUS at 10:18

## 2019-03-11 RX ADMIN — DEXTRAN 70, AND HYPROMELLOSE 2910 2 DROP: 1; 3 SOLUTION/ DROPS OPHTHALMIC at 06:39

## 2019-03-11 RX ADMIN — PREDNISONE 60 MG: 20 TABLET ORAL at 07:39

## 2019-03-11 RX ADMIN — CIPROFLOXACIN AND DEXAMETHASONE 4 DROP: 3; 1 SUSPENSION/ DROPS AURICULAR (OTIC) at 07:39

## 2019-03-11 RX ADMIN — RANITIDINE 150 MG: 150 TABLET ORAL at 07:39

## 2019-03-11 RX ADMIN — DEXTRAN 70, AND HYPROMELLOSE 2910 2 DROP: 1; 3 SOLUTION/ DROPS OPHTHALMIC at 14:35

## 2019-03-11 RX ADMIN — DEXTRAN 70, AND HYPROMELLOSE 2910 2 DROP: 1; 3 SOLUTION/ DROPS OPHTHALMIC at 07:38

## 2019-03-11 ASSESSMENT — ACTIVITIES OF DAILY LIVING (ADL)
ADLS_ACUITY_SCORE: 10

## 2019-03-11 NOTE — PLAN OF CARE
Status: pt POD#3 s/p tube placement R ear s/t ear infection  VS: AVSS  Neuros: R facial droop, R facial numbness  GI: Regular diet  : voiding  IV: IV SL between abx  Activity: up ad radha  Pain: pt ear ache managed with scheduled tylenol  Respiratory: denies SOB  Plan of care: continue IV abx

## 2019-03-11 NOTE — PROGRESS NOTES
[facility] ID Service: Sign Off Note     Patient:  Reuben Wilder, Date of birth 1980, Medical record number [id number]  Date of Visit:  March 11, 2019         Assessment and Recommendations:   ID Problem List:  1. Otomastoiditis following ear infection dx 2/26 s/p 7 days of amoxicillin and 3 days of cefdinir  2. 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 CT of temporal bones shows otomastoiditis and tegmentum mastoideum and tympani dehiscence cannot be excluded. No clinical concern for meningitis.  4. Hx MRSA last year near L ear, boil/pimple a few months ago near R ear    Discussion:  Patient is a 38 year old with no significant pmhx who presents with otomastoiditis and R facial nerve palsy. Discussed with ENT today. We are still awaiting middle ear cultures and would rather the patient not need to remain inpatient pending results. Discharge would require PICC placement and IV antibiotics pending culture results.  Additionally, there is a question on imaging of tegmentum tympani dehiscence which is in close proximity to the CNS. Therefore, we decided to place a PICC to allow for discharge and treat with 2 weeks of IV antibiotics at CNS dosing. Given his lack of symptoms of meningitis and the plan to treat with CNS dosing regardless of results, we will not pursue an LP at this time. After 2 weeks of IV antibiotics, we could likely change to oral antibiotics based on culture results to complete ~4 weeks of therapy for mastoiditis.     Recommendations:  1. Place PICC  2. Increase ceftriaxone dosing to 2g IV Q12H  3. Continue vancomycin and ceftriaxone upon discharge to complete 2 weeks (through 3/24)  4. Goal vancomycin trough 15-20  5. We will plan to see him in ID clinic in 2 weeks to discus transition to oral therapy, likely for an additional 2 weeks. ID clinic will contact him with an appointment time.   6. Check vancomycin level, CBC, CRP, CMP weekly while on therapy with labs faxed to the ID clinic at  [phone]  7. More frequent labs may be needed until therapeutic vancomycin dosing is established  8. I added a CRP onto his 3/9 labs. If elevated, it might be a useful marker to follow.     It has been a pleasure to be involved with this patient's care. We will sign off for now, but please feel free to call with additional questions.     Zelda Booth MD  Infectious Diseases  [phone]       Interval History:   Reuben is doing well today. He has a little ringing in his infected ear. He already has heard about the plan for a PICC and home antibiotics and is amenable to this plan. He reports improvement in his symptoms from admission but no change in them overnight. He has been afebrile. Culture so far is growing only CoNS.        History of Present Illness:       Patient is a 38 year old without much pmhx who presents with facial nerve palsy following an ear infection with concern for otomastoiditis and concern for CNS involvement on CT. He reports he was in his usual state of health until about 10 days ago when he developed a fever and ear pain and was found to have an ear infection. He was placed on amoxicillin for a week which he reports finishing and then cefdinir for 3 days. However he then developed a R facial palsy with R facial paralysis and difficulty closing his R eye completely. He went to urgent care and was sent to the ED. There ENT saw him and placed him on IV ceftriaxone for otomastoiditis and he was admitted. His temporal bone CT showed otomastoiditis and tegmentum mastoideum and tympani dehiscence cannot be excluded.    Here he was found to have a R effusion from his ear which was drained with a tube placed 3/9 which he reports helping. Cultures were sent and are pending. He thinks his facial palsy is slightly improved since admission. He has some ear pain which is improved. Fever only on 2/26 but none since starting antibiotics. No visual changes although had acquired glasses for driving  recently. No visual blurring. He reports he has a 5 year old daughter who had the flu and an ear infection shortly before he became ill. She is in .     He was diagnosed with MRSA last year after he had a boil near his L ear. He reports a few months ago he had a boil/pimple by his R ear which he popped and it went away.            Review of Systems:   4 point ROS including Respiratory, CV, GI and , other than that noted in the HPI,  is negative          Current Antimicrobials:   Vancomycin 3/9-present  Ceftriaxone 3/9- currently dosed at 1g daily         Physical Exam:   Ranges for vital signs:    Temp:  [97  F (36.1  C)-97.5  F (36.4  C)] 97.1  F (36.2  C)  Pulse:  [76] 76  Heart Rate:  [70-74] 74  Resp:  [16] 16  BP: (125-136)/(64-75) 131/75  SpO2:  [95 %-97 %] 95 %    Exam:  GENERAL:  Well-developed, well-nourished, sitting in bed in no acute distress. Mild right facial droop.   ENT:  Head is normocephalic, atraumatic. Anterior oropharynx without ulcers.  EYES:  Eyes have anicteric sclerae.    NECK:  Supple.  LUNGS:  Normal respiratory effort.   ABDOMEN:  Non-distended.   EXT: Extremities without visible edema.   SKIN:  No acute rashes.  PIV is in place without any surrounding erythema.  NEUROLOGIC:  Grossly nonfocal.          Laboratory Data:   Reviewed.  Pertinent for:    Culture data:    3/9/19  R middle ear culture  Gram Stain shows rare GPCs  Culture with CoNS to date    No blood cultures.          Imaging:     CT HEAD W/O CONTRAST 3/9/2019 6:12 PM     Provided History: right ear pain and new bell's palsy, eval for  mastoiditis     Comparison: None available.     Technique: Using multidetector thin collimation helical acquisition  technique, axial, coronal and sagittal CT images from the skull base  to the vertex were obtained without intravenous contrast.      Findings:    No intracranial hemorrhage, mass effect, or midline shift. The  ventricles are proportionate to the cerebral sulci. The gray to  white  matter differentiation of the cerebral hemispheres is preserved. The  basal cisterns are patent.     The visualized paranasal sinuses are clear. Near complete  opacification of right mastoid air cells. The left mastoid air cells  are clear.                                                                      Impression:  1.  Near complete opacification of right mastoid air cells, compatible  with mastoiditis.  2.   No acute intracranial pathology.    CT TEMPORAL BONES WO & W CONTRAST 3/9/2019 8:35 PM     Provided History: ear pain and right facial droop     Comparison: Head CT same day      Technique: Using multidetector thin collimation helical acquisition  technique, axial and coronal thin section CT images were reconstructed  through both temporal bones. Additional reconstructions performed in  the planes of Poschl and Stenver were also obtained. Images were  reviewed in a bone window.     Findings:   Limited evaluation due to reconstruction images.     Right temporal bone: The mastoid air cells are near complete  opacified. There is no debris in the external auditory canal. The  tympanic membrane is intact. There is fluid or soft tissue thickening  in the right middle ear cavity. The bony ossicles are intact and in  normal alignment. The epitympanum is clear. The bony scutum is sharp.  The internal auditory canal and the labyrinthine structures are within  normal limits. Osseous irregularity along the tegmen, dehiscence  cannot be excluded.     Left temporal bone: The mastoid air cells are clear. There is no  debris in the external auditory canal. The tympanic membrane is  intact. There is no fluid or soft tissue thickening in the left middle  ear cavity. The bony ossicles are intact and in normal alignment. The  epitympanum is clear. The bony scutum is sharp. The internal auditory  canal and the labyrinthine structures are within normal limits. The  facial nerve canal appears normal along its  course.                                                                      Impression:    1.  Right otomastoiditis. Tegmentum mastoideum and tympani dehiscence  cannot be excluded.  2.  Left temporal bone is unremarkable.

## 2019-03-11 NOTE — PHARMACY-VANCOMYCIN DOSING SERVICE
Pharmacy Vancomycin Initial Note  Date of Service March 10, 2019  Patient's  1980  38 year old, male    Indication: Otomastoiditis / possible meningitis    Current estimated CrCl = CrCl cannot be calculated (Unknown ideal weight.).    Creatinine for last 3 days  3/9/2019:  6:32 PM Creatinine 0.92 mg/dL    Recent Vancomycin Level(s) for last 3 days  No results found for requested labs within last 72 hours.      Vancomycin IV Administrations (past 72 hours)                   vancomycin (VANCOCIN) 2,250 mg in sodium chloride 0.9 % 500 mL intermittent infusion (mg) 2,250 mg New Bag 19 2153                Nephrotoxins and other renal medications (From now, onward)    Start     Dose/Rate Route Frequency Ordered Stop    19 1000  vancomycin (VANCOCIN) 1,750 mg in sodium chloride 0.9 % 500 mL intermittent infusion      1,750 mg  over 2 Hours Intravenous EVERY 12 HOURS 03/10/19 2144      03/10/19 214  vancomycin (VANCOCIN) 2,500 mg in sodium chloride 0.9 % 500 mL intermittent infusion      2,500 mg  over 2 Hours Intravenous ONCE 03/10/19 2144      03/09/19 222  ibuprofen (ADVIL/MOTRIN) tablet 400 mg      400 mg Oral EVERY 6 HOURS PRN 19 2229            Contrast Orders - past 72 hours (72h ago, onward)    Start     Dose/Rate Route Frequency Ordered Stop    19  iopamidol (ISOVUE-370) solution 75 mL      75 mL Intravenous ONCE 19                Plan:  1.  Pt received one dose of 2250 mg (~21 mg/kg) yesterday around 22:00, but this was discontinued. Given concern for meningitis and hx of MRSA infection, would re-load with a higher dose of vancomycin  2500 mg (~24 mg/kg) IV x1, followed by 1750 mg (~16.5mg/kg) IV q12h.  2.  Goal Trough Level: 20mg/L  3.  Pharmacy will check trough levels as appropriate in 1-3 Days.    4. Serum creatinine levels will be ordered daily for the first week of therapy and at least twice weekly for subsequent weeks.    5. [place] method  utilized to dose vancomycin therapy: Method 2    Eleanor Broadbent, PharmD, [facility]  PGY-2 Infectious Diseases Pharmacy Resident

## 2019-03-11 NOTE — PROGRESS NOTES
Therapy: IV abx   Insurance: [facility]  Ded: $1000  Met: $0    Co-Insurance: 80%  Max Out of Pocket: $4000  Met: $158      In reference to admission on 3/9/19 to check IV abx coverage    Please contact Intake with any questions, [phone] or In Basket pool, [facility] ([phone]).

## 2019-03-11 NOTE — PROGRESS NOTES
Home Infusion  Reuben is expected to discharge today and will be going home on IV rocephin q 12 hrs and IV vanco q 12 hrs.  He has never done home IV therapy before and will not have the opportunity to attend the [facility]  Met with Reuben at bedside and provided him with information about [facility] services.  Explained about administration methods for both drugs, showed him the teaching materials and explained that either I or a home nurse will provide the teaching needed for self-administration.  Informed him about supplies and delivery of supplies, storage of medication, dosing times, plan for SNV and 24/7 availability of [facility] staff while on IV therapy.     Reuben verbalized understanding of all information given.   He is willing and able to learn and manage home IV therapy and is agreeable to a teach here by me if home nursing is not available tonight.  Questions answered.  Will continue to follow until discharge, update pt once final orders are determined and provide teaching if needed.    Amita AVILA  [facility] Liaison  [phone]

## 2019-03-11 NOTE — PLAN OF CARE
Status: Pt on 6A s/p ear infection w/progressive R facial nerve paresis. PE tube placed  VS: VSS on RA, afebrile  Neuros: A&Ox4. R facial numbness and slight R droop. Decreased hearing on R side  GI: Tolerating regular diet, passing gas  : Voiding w/out difficulty  IV: PIV removed, single lumen PICC placed for IV abx at home  Activity: Up independently  Pain: Denies  Skin: Reports increased R facial tightness and R ear fullness  Labs/Tests: Cultures pending  Social: Home infusion RN met w/pt and completed orientation to service, PLC completed PICC teaching  Plan of care: Pt discharged to home. AVS reviewed w/pt w/no additional questions. Medications picked up from discharge pharmacy. Home infusion sent evening doses of rocephin and vanco, pt to complete at home.

## 2019-03-11 NOTE — PROGRESS NOTES
Care Coordinator Progress Note    Admission Date/Time:  3/9/2019  Attending MD:  Brooke Chavez MD    Data  Chart reviewed, discussed with interdisciplinary team.   Patient was admitted for:    Mastoiditis of right side  Bell's palsy.    Concerns with insurance coverage for discharge needs: None identified  Current Living Situation: Patient lives with Wife  Support System: Wife, Wen is Primary Caregiver  Services Involved:   Transportation at Discharge: Wife   Transportation to Medical Appointments: Family  Barriers to Discharge: None identified    Coordination of Care and Referrals: Provided patient/family with options for Home Infusion      Assessment  Pt admitted with Mastoiditis, medically stable for discharge today. Per ENT, Pt will require 2 IV abx post discharge. Pt will have PICC line placed and can discharge this evening after Vancomycin dose.  Referral made to [facility] for IV Rocephin, Vanco and IV supplies, [facility] Liaison will provide teaching.  Pt agreeable to above plan and voices understanding.     Plan  Anticipated Discharge Date:  3/11/19  Anticipated Discharge Plan:  Discharge to home with IV antibiotics provided by   Home Infusion    Tena Issa RN   6B care coordinator #[phone]

## 2019-03-11 NOTE — PROGRESS NOTES
"Otolaryngology Progress Note  March 11, 2019    S: No acute events overnight. Minimal discomfort well-managed with Tylenol. No eye discomfort or foreign body sensation. Feels subjectively improved to some extent, but face feels tighter today.  Few twinges of pain last night.  No headache, light or sound sensitivity; no neck stiffness.    O: /69 (BP Location: Right arm)   Pulse 76   Temp 97.5  F (36.4  C) (Oral)   Resp 16   Ht 1.803 m (5' 11\")   Wt 106 kg (233 lb 11 oz)   SpO2 97%   BMI 32.59 kg/m     General: Resting comfortably in bed, in no acute distress   HEENT: EOMI. HB 2/6 on right, 1/6 on left. Has forehead activation, but reduced verus left.  With minimal effort eye closure, scleral show present; no scleral show with maximal effort.  Reduced activation of midface and oral commissure.  Cannot fully hold seal when puffing out lips, but close.  Right ear exam with minimal drainage in canal, patent PE tube.  On scope exam, draining a modest amount of thin yellow fluid.   Pulmonary: Breathing non-labored, no stridor, no accessory muscle use    LABS:  BMP  Recent Labs   Lab 03/09/19  1832      POTASSIUM 3.9   CHLORIDE 107   ED 8.7   CO2 26   BUN 13   CR 0.92   *     CBC  Recent Labs   Lab 03/09/19  1832   WBC 6.9   RBC 5.03   HGB 15.2   HCT 44.4   MCV 88   MCH 30.2   MCHC 34.2   RDW 12.3        Gram stain: rare GPCs, culture pending    Assessment and Plan  Reuben Wilder is a 38 year old male with right otomastoiditis complicated by progressive right facial nerve paresis. He had received amoxicillin from OSH; resistant Strep suspected. He had a cloudy yellow effusion on the right on exam, likely a sequela of his incompletely treated infection. The current imaging does not show signs of cholesteatoma or other invasive process. S/p PE tube placement and collection of the middle ear effusion for culture. On antibiotics that have CSF coverage for now given high rate of co-occurring " complications once one complication of otomastoiditis is present. Awaiting culture results to narrow antibiotics before sending home and observing facial function for progression that would indicate a need to consider additional surgical intervention.    Addendum:  Cultures returned with Coag Neg. Staph; additional cultures pending.     Neuro:  - Pain control: scheduled Tylenol, PRN ibuprofen and oxycodone. PRN melatonin for sleep.    HEENT:  - Ciprodex to right ear TID  - IV antibiotics with CSF coverage, per ID consult. Greatly appreciate their recommendations.   - Discussed cultures and plan with ID. Given Coag neg Staph and presence of potential tegmen dehiscences plus existing complication of otomastoiditis (facial paresis), will plan for IV antibiotics (vanc, ceftriaxone) x 2 weeks at direction of ID; plan for transition to orals pending recommendations of ID.  Will work to arrange indwelling IV and infusion.  Discussed the rationale with patient in detail, including pros and cons of different approaches, risks, and benefits.    - Prednisone 60mg every day for facial nerve paresis.  Plan for 14 day course. Ongoing close follow up for progression discussed with patient.  - Natural tears and lacrilube to right eye    Respiratory:  - Supplemental O2 PRN to keep sats >92%    CV/heme:  - Hemodynamically stable    FEN/GI:  - Regular diet  - Bowel regimen: senna/docusate    :  - Voiding independently    Endo  - No issues    ID:  - Appreciate ID recommendations; continue ceftriaxone and vancomycin (see plan under HEENT section above)  - Watch cultures and narrow appropriately; gram stain showing gram positive cocci, coag neg staph  - Afebrile, no leukocytosis    PPX:  - H2 blocker due to steroid use  - SCDs  - IS    Dispo: IV antibiotics until cultures available to narrow    Echo Rogers MD PGY-1  Otolaryngology-Head & Neck Surgery  Please contact ENT with questions by dialing [phone] and entering job code  [phone] when prompted.    I saw and evaluated the patient. I agree with the findings and the plan of care as documented in the resident s note.    Marlen Villalta MD  Otology & Neurotology  [facility]

## 2019-03-11 NOTE — CONSULTS
03/11/19 1522 Calista Strickland, REECE       Patient seen at bedside for PICC/IV medication class. RD correctly on model with all cares including flushing, cap change,IVP and use of home pump infusion for ABX therapy. Received written material related to all content taught. Asked relevant questions. Answered all teach-back questions appropriately. States he understands all information presented and feels safe going home doing cares. PICC not placed at time of class.

## 2019-03-12 ENCOUNTER — PATIENT OUTREACH (OUTPATIENT)
Dept: CARE COORDINATION | Facility: CLINIC | Age: 39
End: 2019-03-12

## 2019-03-12 ENCOUNTER — HOME INFUSION (PRE-WILLOW HOME INFUSION) (OUTPATIENT)
Dept: PHARMACY | Facility: CLINIC | Age: 39
End: 2019-03-12

## 2019-03-12 LAB
ALBUMIN SERPL-MCNC: 4 G/DL (ref 3.4–5)
ALP SERPL-CCNC: 66 U/L (ref 40–150)
ALT SERPL W P-5'-P-CCNC: 28 U/L (ref 0–70)
ANION GAP SERPL CALCULATED.3IONS-SCNC: 7 MMOL/L (ref 3–14)
AST SERPL W P-5'-P-CCNC: 13 U/L (ref 0–45)
BACTERIA SPEC CULT: ABNORMAL
BACTERIA SPEC CULT: ABNORMAL
BASOPHILS # BLD AUTO: 0 10E9/L (ref 0–0.2)
BASOPHILS NFR BLD AUTO: 0.1 %
BILIRUB SERPL-MCNC: 0.3 MG/DL (ref 0.2–1.3)
BUN SERPL-MCNC: 15 MG/DL (ref 7–30)
CALCIUM SERPL-MCNC: 8.6 MG/DL (ref 8.5–10.1)
CHLORIDE SERPL-SCNC: 107 MMOL/L (ref 94–109)
CO2 SERPL-SCNC: 26 MMOL/L (ref 20–32)
CREAT SERPL-MCNC: 0.77 MG/DL (ref 0.66–1.25)
CRP SERPL-MCNC: <2.9 MG/L (ref 0–8)
DIFFERENTIAL METHOD BLD: NORMAL
EOSINOPHIL # BLD AUTO: 0 10E9/L (ref 0–0.7)
EOSINOPHIL NFR BLD AUTO: 0 %
ERYTHROCYTE [DISTWIDTH] IN BLOOD BY AUTOMATED COUNT: 12.3 % (ref 10–15)
GFR SERPL CREATININE-BSD FRML MDRD: >90 ML/MIN/{1.73_M2}
GLUCOSE SERPL-MCNC: 167 MG/DL (ref 70–99)
HCT VFR BLD AUTO: 42.5 % (ref 40–53)
HGB BLD-MCNC: 14.8 G/DL (ref 13.3–17.7)
IMM GRANULOCYTES # BLD: 0 10E9/L (ref 0–0.4)
IMM GRANULOCYTES NFR BLD: 0.3 %
LYMPHOCYTES # BLD AUTO: 1.2 10E9/L (ref 0.8–5.3)
LYMPHOCYTES NFR BLD AUTO: 15.4 %
MCH RBC QN AUTO: 30.5 PG (ref 26.5–33)
MCHC RBC AUTO-ENTMCNC: 34.8 G/DL (ref 31.5–36.5)
MCV RBC AUTO: 87 FL (ref 78–100)
MONOCYTES # BLD AUTO: 0.4 10E9/L (ref 0–1.3)
MONOCYTES NFR BLD AUTO: 5.2 %
NEUTROPHILS # BLD AUTO: 5.9 10E9/L (ref 1.6–8.3)
NEUTROPHILS NFR BLD AUTO: 79 %
NRBC # BLD AUTO: 0 10*3/UL
NRBC BLD AUTO-RTO: 0 /100
PLATELET # BLD AUTO: 320 10E9/L (ref 150–450)
POTASSIUM SERPL-SCNC: 4 MMOL/L (ref 3.4–5.3)
PROT SERPL-MCNC: 7.7 G/DL (ref 6.8–8.8)
RBC # BLD AUTO: 4.86 10E12/L (ref 4.4–5.9)
SODIUM SERPL-SCNC: 140 MMOL/L (ref 133–144)
SPECIMEN SOURCE: ABNORMAL
VANCOMYCIN SERPL-MCNC: 7.6 MG/L
WBC # BLD AUTO: 7.5 10E9/L (ref 4–11)

## 2019-03-12 PROCEDURE — 86140 C-REACTIVE PROTEIN: CPT | Performed by: INTERNAL MEDICINE

## 2019-03-12 PROCEDURE — 80202 ASSAY OF VANCOMYCIN: CPT | Performed by: INTERNAL MEDICINE

## 2019-03-12 PROCEDURE — 80053 COMPREHEN METABOLIC PANEL: CPT | Performed by: INTERNAL MEDICINE

## 2019-03-12 PROCEDURE — 85025 COMPLETE CBC W/AUTO DIFF WBC: CPT | Performed by: INTERNAL MEDICINE

## 2019-03-12 NOTE — DISCHARGE SUMMARY
Discharge Summary  Reuben Wilder  [id number]  1980    Date of Admission: 3/9/2019  Date of Discharge: 3/11/2019    Admission Diagnosis: Mastoiditis of right side, complicated by facial nerve palsy  Discharge Diagnosis: Same    Procedures:  Date: 3/9/19 - Right myringotomy, PE tube placement     Microbiology: Coagulase negative Staphylococcus, yeast    HPI: Reuben Wilder is an otherwise healthy 38-year-old male with history of aural fullness, intermittent pain, and progressive right facial weakness consistent with right otomastoiditis complicated by facial nerve paresis. He was initially treated with amoxicillin from an outside urgent care without improvement, concerning for resistant Streptococcus species. He had a cloudy yellow effusion on the right on exam, likely a sequela of his incompletely treated infection. On admission he had HB 3/6 on the right with decreased motion of the frontalis, full eye closure with effort, but a small amount of scleral show on resting eye closure. He was admitted to the hospital for further evaluation and management of his complicated otomastoiditis.    Hospital Course: Imaging was negative for cholesteatoma or other invasive process. A PE tube was placed and a collection of the middle ear effusion was sent for culture, which grew Coagulase negative Staphylococcus and yeast prior to discharge. He was initially started on antibiotics that have CSF coverage given the high rate of co-occurring complications once one complication of otomastoiditis is present. No surgical intervention was indicated as his facial paresis quickly improved. A PICC line was placed for long-term IV access, and he was discharged on vancomycin and ceftriaxone via home infusions as recommended by the Infectious Disease service. Follow-up was scheduled with both ID and ENT. On discharge, the patient's condition was noted to be significantly improved with pain relief and increasing facial nerve function. Bowel  "function, kidney function, and cardiorespiratory status remained stable throughout his hospitalization.    Discharge Exam:  Vitals:    03/10/19 1500 03/10/19 2100 03/10/19 2331 03/11/19 0740   BP: 136/64  125/69 131/75   BP Location: Left arm  Right arm Right arm   Pulse: 76      Resp: 16  16 16   Temp: 97  F (36.1  C)  97.5  F (36.4  C) 97.1  F (36.2  C)   TempSrc: Oral  Oral Oral   SpO2: 96%  97% 95%   Weight:       Height:  1.803 m (5' 11\")       General: Resting comfortably in bed, in no acute distress  HEENT: EOMI. HB 2/6 on right, 1/6 on left. Has forehead activation, but reduced verus left.  With minimal effort eye closure, scleral show present; no scleral show with maximal effort.  Reduced activation of midface and oral commissure. Cannot fully hold seal when puffing out lips, but close. Right ear exam with minimal drainage in canal, patent PE tube. On scope exam, draining a modest amount of thin yellow fluid.  Pulmonary: Breathing non-labored, no stridor, no accessory muscle use      Discharge Medications:   Reuben Wilder   Home Medication Instructions ALYSSA:[id number]    Printed on:03/12/19 5632   Medication Information                      acetaminophen (TYLENOL) 325 MG tablet  Take 2 tablets (650 mg) by mouth every 4 hours as needed for mild pain             artificial tears OINT ophthalmic ointment  Place 1 g into the right eye At Bedtime             cefTRIAXone (ROCEPHIN) 1 GM vial  Inject 2 g (2,000 mg) into the vein every 12 hours for 12 days             ciprofloxacin-dexamethasone (CIPRODEX) 0.3-0.1 % otic suspension  Place 4 drops into the right ear 3 times daily for 14 days             hypromellose-dextran (ARTIFICAL TEARS) 0.1-0.3 % ophthalmic solution  Place 2 drops into the right eye every 2 hours (while awake)             ibuprofen (ADVIL/MOTRIN) 400 MG tablet  Take 1 tablet (400 mg) by mouth every 6 hours as needed for moderate pain             predniSONE (DELTASONE) 20 MG tablet  Take 60 mg by " mouth every morning for 12 days.             ranitidine (ZANTAC) 150 MG tablet  Take 1 tablet (150 mg) by mouth 2 times daily for 14 days             vancomycin 1,750 mg  Inject 1,750 mg into the vein every 12 hours for 12 days Pharmacy to dose, goal trough levels 15-20               Discharge Procedure Orders   CBC with platelets differential   Standing Status: Future Standing Exp. Date: 05/10/19   Order Comments: Fax results to Dr. Meza in ID clinic: [phone]    Last Lab Result: Hemoglobin (g/dL)       Date                     Value                 03/09/2019               15.2             ----------   Scheduling Instructions: Home infusion RN to draw weekly while on IV antibiotic therapy     Comprehensive metabolic panel   Standing Status: Future Standing Exp. Date: 05/10/19   Order Comments: Fax results to Dr. Meza in ID clinic: [phone]   Scheduling Instructions: Home infusion RN to draw weekly while on IV antibiotic therapy     CRP inflammation   Standing Status: Future Standing Exp. Date: 05/10/19   Order Comments: Fax results to Dr. Meza in ID clinic: [phone]   Scheduling Instructions: Home infusion RN to draw weekly while on IV antibiotic therapy     Home infusion referral   Number of Visits Requested: 1     Reason for your hospital stay   Order Comments: IV antibiotics for mastoiditis infection     Activity   Order Comments: Your activity upon discharge: No heavy lifting greater than 10 lbs and no strenuous exercise for 2 weeks or until follow up appointment.     Order Specific Question Answer Comments   Is discharge order? Yes      When to contact your care team   Order Comments: Please notify your doctor if you experience worsening facial weakness, wound breakdown, worsening pain or swelling, increase in purulent malorodorous discharge from the ear which may indicate worsening infection. If you feel it is acute, or experience sudden changes in breathing, chest pain, or excessive  "sleepiness/somnolence please return to the emergency department or call 911. If you have questions or concerns during the day please call ENT clinic and [phone]. If at night you can call [facility] at [phone] and ask for the \"ENT resident on call\".     Adult [facility] Follow-up and recommended labs and tests   Order Comments: Follow up in ENT clinic with Dr. Villalta on Friday, 3/18 at 10:00AM. Please call the clinic with questions/concerns: [phone].  Otolaryngology/ENT Clinic:  [facility]  [facility]  [address] [place] [address]    Follow up with Dr. Meza in infectious disease clinic in 2 weeks prior to finishing your IV antibiotic course.   [facility]    [facility]    [address], [address]   [address] [place] [address]   Appointments:   [phone]     Appointments on [place] and/or [facility] (with [facility] or [facility] provider or service). Call [phone] if you haven't heard regarding these appointments within 7 days of discharge.     Follow Up and recommended labs and tests   Order Comments: Weekly lab draws for CBC, CMP, and CRP. Vancomycin trough draws per pharmacy.     Full Code     Order Specific Question Answer Comments   Code status determined by: Discussion with patient/legal decision maker      Diet   Order Comments: Follow this diet upon discharge:Regular diet     Order Specific Question Answer Comments   Is discharge order? Yes      Dispo: To home in good condition. All of the patient's questions/concerns have been addressed at this time.     Echo Rogers MD PGY-1  Otolaryngology-Head & Neck Surgery  Please contact ENT by dialing [phone] and entering job code [phone].  "

## 2019-03-12 NOTE — PROGRESS NOTES
This is a recent snapshot of the patient's [facility] medical record.  For current drug dose and complete information and questions, call [phone] or In Basket pool, [facility] home infusion ([id number])  CSN Number:  [id number]

## 2019-03-13 ENCOUNTER — HOME INFUSION (PRE-WILLOW HOME INFUSION) (OUTPATIENT)
Dept: PHARMACY | Facility: CLINIC | Age: 39
End: 2019-03-13

## 2019-03-13 NOTE — PROGRESS NOTES
This is a recent snapshot of the patient's [facility] medical record.  For current drug dose and complete information and questions, call [phone] or In Basket pool, [facility] home infusion ([id number])  CSN Number:  [id number]

## 2019-03-13 NOTE — TELEPHONE ENCOUNTER
FUTURE VISIT INFORMATION      FUTURE VISIT INFORMATION:    Date: 3/15/19    Time: 10AM    Location: [facility]  REFERRAL INFORMATION:    Referring provider:  Maco Shane MBBS      Referring providers clinic:  [facility] [address] [facility]      Reason for visit/diagnosis  Other acute nonsuppurative otitis media of right ear, recurrence not specified; Eustachian tube disorder, right    RECORDS REQUESTED FROM:       Clinic name Comments Records Status Imaging Status   [facility] [address] 3/6/19 notes with Maco Shane MBBS    2/27/19 notes with Gideon Freeman MD   [facility]  2/27/18 notes with Abram Mcgregor MD    2/8/18 notes with Terell Live PA-C Care Everywhere    [facility] 3/9/19 CT Temporal Bones and CT Head Epic PACS   [facility] 3/9/19-3/11/19 notes   3/9/19 ENT consult with Dr Scott PINEDA

## 2019-03-14 ENCOUNTER — HOSPITAL ENCOUNTER (EMERGENCY)
Facility: CLINIC | Age: 39
Discharge: HOME | End: 2019-03-14
Attending: EMERGENCY MEDICINE | Admitting: EMERGENCY MEDICINE
Payer: COMMERCIAL

## 2019-03-14 ENCOUNTER — APPOINTMENT (OUTPATIENT)
Dept: CT IMAGING | Facility: CLINIC | Age: 39
End: 2019-03-14
Attending: EMERGENCY MEDICINE
Payer: COMMERCIAL

## 2019-03-14 VITALS
OXYGEN SATURATION: 96 % | TEMPERATURE: 98.1 F | DIASTOLIC BLOOD PRESSURE: 77 MMHG | SYSTOLIC BLOOD PRESSURE: 128 MMHG | BODY MASS INDEX: 32.59 KG/M2 | HEART RATE: 94 BPM | RESPIRATION RATE: 19 BRPM | HEIGHT: 71 IN

## 2019-03-14 DIAGNOSIS — R00.2 PALPITATIONS: ICD-10-CM

## 2019-03-14 LAB
ANION GAP SERPL CALCULATED.3IONS-SCNC: 9 MMOL/L (ref 3–14)
BASOPHILS # BLD AUTO: 0 10E9/L (ref 0–0.2)
BASOPHILS NFR BLD AUTO: 0.2 %
BUN SERPL-MCNC: 12 MG/DL (ref 7–30)
CALCIUM SERPL-MCNC: 8.6 MG/DL (ref 8.5–10.1)
CHLORIDE SERPL-SCNC: 104 MMOL/L (ref 94–109)
CO2 SERPL-SCNC: 26 MMOL/L (ref 20–32)
CREAT SERPL-MCNC: 0.72 MG/DL (ref 0.66–1.25)
DIFFERENTIAL METHOD BLD: NORMAL
EOSINOPHIL # BLD AUTO: 0.1 10E9/L (ref 0–0.7)
EOSINOPHIL NFR BLD AUTO: 2.2 %
ERYTHROCYTE [DISTWIDTH] IN BLOOD BY AUTOMATED COUNT: 12.6 % (ref 10–15)
GFR SERPL CREATININE-BSD FRML MDRD: >90 ML/MIN/{1.73_M2}
GLUCOSE SERPL-MCNC: 119 MG/DL (ref 70–99)
HCT VFR BLD AUTO: 43.7 % (ref 40–53)
HGB BLD-MCNC: 14.9 G/DL (ref 13.3–17.7)
IMM GRANULOCYTES # BLD: 0 10E9/L (ref 0–0.4)
IMM GRANULOCYTES NFR BLD: 0.3 %
LYMPHOCYTES # BLD AUTO: 0.9 10E9/L (ref 0.8–5.3)
LYMPHOCYTES NFR BLD AUTO: 14.5 %
MCH RBC QN AUTO: 30 PG (ref 26.5–33)
MCHC RBC AUTO-ENTMCNC: 34.1 G/DL (ref 31.5–36.5)
MCV RBC AUTO: 88 FL (ref 78–100)
MONOCYTES # BLD AUTO: 0.2 10E9/L (ref 0–1.3)
MONOCYTES NFR BLD AUTO: 3.5 %
NEUTROPHILS # BLD AUTO: 4.7 10E9/L (ref 1.6–8.3)
NEUTROPHILS NFR BLD AUTO: 79.3 %
NRBC # BLD AUTO: 0 10*3/UL
NRBC BLD AUTO-RTO: 0 /100
PLATELET # BLD AUTO: 335 10E9/L (ref 150–450)
POTASSIUM SERPL-SCNC: 3.9 MMOL/L (ref 3.4–5.3)
RBC # BLD AUTO: 4.97 10E12/L (ref 4.4–5.9)
SODIUM SERPL-SCNC: 140 MMOL/L (ref 133–144)
TROPONIN I SERPL-MCNC: <0.015 UG/L (ref 0–0.04)
WBC # BLD AUTO: 5.9 10E9/L (ref 4–11)

## 2019-03-14 PROCEDURE — 99285 EMERGENCY DEPT VISIT HI MDM: CPT | Mod: 25 | Performed by: EMERGENCY MEDICINE

## 2019-03-14 PROCEDURE — 71260 CT THORAX DX C+: CPT

## 2019-03-14 PROCEDURE — 93005 ELECTROCARDIOGRAM TRACING: CPT | Performed by: EMERGENCY MEDICINE

## 2019-03-14 PROCEDURE — 25000128 H RX IP 250 OP 636: Performed by: RADIOLOGY

## 2019-03-14 PROCEDURE — 85025 COMPLETE CBC W/AUTO DIFF WBC: CPT | Performed by: EMERGENCY MEDICINE

## 2019-03-14 PROCEDURE — 93010 ELECTROCARDIOGRAM REPORT: CPT | Mod: Z6 | Performed by: EMERGENCY MEDICINE

## 2019-03-14 PROCEDURE — 84484 ASSAY OF TROPONIN QUANT: CPT | Performed by: EMERGENCY MEDICINE

## 2019-03-14 PROCEDURE — 80048 BASIC METABOLIC PNL TOTAL CA: CPT | Performed by: EMERGENCY MEDICINE

## 2019-03-14 RX ORDER — IOPAMIDOL 755 MG/ML
73 INJECTION, SOLUTION INTRAVASCULAR ONCE
Status: COMPLETED | OUTPATIENT
Start: 2019-03-14 | End: 2019-03-14

## 2019-03-14 RX ADMIN — IOPAMIDOL 73 ML: 755 INJECTION, SOLUTION INTRAVENOUS at 17:27

## 2019-03-14 ASSESSMENT — ENCOUNTER SYMPTOMS
FEVER: 0
COUGH: 0
SHORTNESS OF BREATH: 1
PALPITATIONS: 1

## 2019-03-14 NOTE — PROGRESS NOTES
This is a recent snapshot of the patient's [facility] medical record.  For current drug dose and complete information and questions, call [phone] or In Basket pool, [facility]v home infusion ([id number])  CSN Number:  [id number]

## 2019-03-14 NOTE — ED PROVIDER NOTES
"    [place] EMERGENCY DEPARTMENT ([facility])  3/14/19   History     Chief Complaint   Patient presents with     Shortness of Breath     The history is provided by the patient and medical records.     Reuben Wilder is a 38 year old otherwise healthy male who was recently hospitalized here from 3/9/2019 to 3/11/2019 with right otomastoiditis complicated by facial nerve paresis.  Patient had a PICC line placed during his hospitalization and discharged with plan for IV vancomycin and ceftriaxone.  Patient is scheduled to follow-up with ENT tomorrow.    The patient presents to the Emergency Department today for evaluation of shortness of breath and a feeling that his heart was beating harder.  The patient reports that he was driving home from work today before 2 PM to do his 2 PM IV antibiotic dose.  The patient reports that when he was driving home he began feeling that his heart was beating harder than normal, he does not feel that it was racing.  He then developed a feeling that he was not getting enough oxygen.  He reports that he could breathe normally, but he just felt that his oxygen was \"escaping\".  The patient reports that he was also having some mild upper sternal chest pain associated with his symptoms.  The patient works as an .  The patient notes that he noticed some mild right knee swelling last night, but this has since resolved.  He denies any history of DVT/PE.  The patient is a past smoker, he quit 6.5 years ago.  He denies any fevers or cough.  The patient reports that his antibiotic course has been doing well.    I have reviewed the Medications, Allergies, Past Medical and Surgical History, and Social History in the Epic system.    History reviewed. No pertinent past medical history.    Past Surgical History:   Procedure Laterality Date     PICC INSERTION Left 03/11/2019    4Fr - 49cm (5cm external), basilic vein, low SVC       History reviewed. No pertinent family " "history.    Social History     Tobacco Use     Smoking status: Never Smoker     Smokeless tobacco: Never Used   Substance Use Topics     Alcohol use: No     Frequency: Never       No current facility-administered medications for this encounter.      Current Outpatient Medications   Medication     cefTRIAXone (ROCEPHIN) 1 GM vial     predniSONE (DELTASONE) 20 MG tablet     vancomycin 1,750 mg     acetaminophen (TYLENOL) 325 MG tablet     artificial tears OINT ophthalmic ointment     ciprofloxacin-dexamethasone (CIPRODEX) 0.3-0.1 % otic suspension     hypromellose-dextran (ARTIFICAL TEARS) 0.1-0.3 % ophthalmic solution     ibuprofen (ADVIL/MOTRIN) 400 MG tablet     ranitidine (ZANTAC) 150 MG tablet        Allergies   Allergen Reactions     No Known Allergies          Review of Systems   Constitutional: Negative for fever.   Respiratory: Positive for shortness of breath. Negative for cough.    Cardiovascular: Positive for chest pain (upper sternal; mild) and palpitations.   All other systems reviewed and are negative.      Physical Exam   BP: 140/89  Heart Rate: 88  Temp: 98.1  F (36.7  C)  Resp: 20  Height: 180.3 cm (5' 11\")  SpO2: 93 %      Physical Exam   Constitutional: He is oriented to person, place, and time. He appears well-developed and well-nourished.   HENT:   Head: Normocephalic and atraumatic.   Neck: Normal range of motion. Neck supple.   Cardiovascular: Normal rate, regular rhythm and normal heart sounds. Exam reveals no friction rub.   No murmur heard.  PICC line in left arm   Pulmonary/Chest: Effort normal. No stridor. No respiratory distress. He has no wheezes.   Abdominal: Soft. He exhibits no distension. There is no tenderness. There is no rebound.   Neurological: He is alert and oriented to person, place, and time.   Skin: Skin is warm.   Psychiatric: He has a normal mood and affect. His behavior is normal. Thought content normal.       ED Course   4:07 PM  The patient was seen and examined by " Tasneem Johnson MD in Room ED06.        Procedures             EKG Interpretation:      Interpreted by Tasneem Johnson  Time reviewed: 1535  Symptoms at time of EKG: palpitations   Rhythm: normal sinus   Rate: normal  Axis: normal  Ectopy: none  Conduction: normal  ST Segments/ T Waves: No ST-T wave changes  Q Waves: none  Comparison to prior: No old EKG available    Clinical Impression: normal EKG          Critical Care time:  none     Results for orders placed or performed during the hospital encounter of 03/14/19   CT Chest Pulmonary Embolism w Contrast    Narrative    EXAMINATION: CTA pulmonary angiogram, 3/14/2019 5:39 PM     COMPARISON: None.    HISTORY: PE suspected, high pretest probability    TECHNIQUE: Volumetric helical acquisition of CT images of the chest  from the lung apices to the kidneys were acquired after the  administration of 80 mL of Isovue-370 IV contrast. Flash technique  with free breathing acquisition.  Three-dimensional (3D)  post-processed angiographic images were reconstructed, archived to  PACS and used in interpretation of this study.     FINDINGS:  Contrast bolus is: excellent.  Exam is negative for acute  pulmonary embolism. No evidence of right heart strain.     No focal consolidation. No suspicious nodules. Left lower lobe  pneumatocele. No thoracic lymphadenopathy. Visualized inferior thyroid  gland is unremarkable. Left upper extremity PICC tip terminates at the  caval junction.    Upper abdomen: Normal.      Impression    IMPRESSION:   Exam is negative for acute pulmonary embolism.      In the event of a positive result for acute pulmonary embolism  resulting in right heart strain, please activate the PERT  Multidisciplinary group for consultation by paging [phone]  ([phone]).     PERT -- Pulmonary Embolism Response Team (Multidisciplinary team  including cardiology, interventional radiology, critical care,  hematology)   Basic metabolic panel   Result Value Ref Range     Sodium 140 133 - 144 mmol/L    Potassium 3.9 3.4 - 5.3 mmol/L    Chloride 104 94 - 109 mmol/L    Carbon Dioxide 26 20 - 32 mmol/L    Anion Gap 9 3 - 14 mmol/L    Glucose 119 (H) 70 - 99 mg/dL    Urea Nitrogen 12 7 - 30 mg/dL    Creatinine 0.72 0.66 - 1.25 mg/dL    GFR Estimate >90 >60 mL/min/[1.73_m2]    GFR Estimate If Black >90 >60 mL/min/[1.73_m2]    Calcium 8.6 8.5 - 10.1 mg/dL   Troponin I   Result Value Ref Range    Troponin I ES <0.015 0.000 - 0.045 ug/L   CBC with platelets differential   Result Value Ref Range    WBC 5.9 4.0 - 11.0 10e9/L    RBC Count 4.97 4.4 - 5.9 10e12/L    Hemoglobin 14.9 13.3 - 17.7 g/dL    Hematocrit 43.7 40.0 - 53.0 %    MCV 88 78 - 100 fl    MCH 30.0 26.5 - 33.0 pg    MCHC 34.1 31.5 - 36.5 g/dL    RDW 12.6 10.0 - 15.0 %    Platelet Count 335 150 - 450 10e9/L    Diff Method Automated Method     % Neutrophils 79.3 %    % Lymphocytes 14.5 %    % Monocytes 3.5 %    % Eosinophils 2.2 %    % Basophils 0.2 %    % Immature Granulocytes 0.3 %    Nucleated RBCs 0 0 /100    Absolute Neutrophil 4.7 1.6 - 8.3 10e9/L    Absolute Lymphocytes 0.9 0.8 - 5.3 10e9/L    Absolute Monocytes 0.2 0.0 - 1.3 10e9/L    Absolute Eosinophils 0.1 0.0 - 0.7 10e9/L    Absolute Basophils 0.0 0.0 - 0.2 10e9/L    Abs Immature Granulocytes 0.0 0 - 0.4 10e9/L    Absolute Nucleated RBC 0.0    EKG 12 lead   Result Value Ref Range    Interpretation ECG Click View Image link to view waveform and result      Medications   iopamidol (ISOVUE-370) solution 73 mL (73 mLs Intravenous Given 3/14/19 1727)   sodium chloride (PF) 0.9% PF flush 101 mL (101 mLs Intravenous Given 3/14/19 1727)               Labs Ordered and Resulted from Time of ED Arrival Up to the Time of Departure from the ED   BASIC METABOLIC PANEL   TROPONIN I   CBC WITH PLATELETS DIFFERENTIAL       No results found for this or any previous visit (from the past 24 hour(s)).           Assessments & Plan (with Medical Decision Making)   Patient is a healthy  38-year-old male who is currently on IV antibiotics for recent mastoiditis who presents to the ER to an episode of palpitations while driving home today.  Patient says he felt his heart beating regular and not fast.  Patient noted some mild tenderness in the upper aspect of his chest.  Patient here has a normal EKG normal labs and normal vital signs.  Patient CT chest is negative for PE.  Patient is feeling better and has no further symptoms.  Patient with no risk factors for ACS.  Feel that patient was having benign palpitations.  Discussed these results with the patient and his wife.  Patient be discharged home with instruction to follow-up with ENT and PCP for further care.    I have reviewed the nursing notes.    I have reviewed the findings, diagnosis, plan and need for follow up with the patient.       Medication List      There are no discharge medications for this visit.         Final diagnoses:   Palpitations     Gary HESS, am serving as a trained medical scribe to document services personally performed by Tasneem Johnson MD, based on the provider's statements to me.   Tasneem HESS MD, was physically present and have reviewed and verified the accuracy of this note documented by Gary King.    3/14/2019   [facility], [place], EMERGENCY DEPARTMENT     Tasneem Johnson MD  03/14/19 2018

## 2019-03-14 NOTE — ED AVS SNAPSHOT
[facility], [place], Emergency Department  [address] [place] [id number]  Phone:  [phone]                                    Reuben Wilder   MRN: [id number]    Department:  [facility], Emergency Department   Date of Visit:  3/14/2019           After Visit Summary Signature Page    I have received my discharge instructions, and my questions have been answered. I have discussed any challenges I see with this plan with the nurse or doctor.    ..........................................................................................................................................  Patient/Patient Representative Signature      ..........................................................................................................................................  Patient Representative Print Name and Relationship to Patient    ..................................................               ................................................  Date                                   Time    ..........................................................................................................................................  Reviewed by Signature/Title    ...................................................              ..............................................  Date                                               Time          22EPIC Rev 08/18

## 2019-03-15 ENCOUNTER — PRE VISIT (OUTPATIENT)
Dept: OTOLARYNGOLOGY | Facility: CLINIC | Age: 39
End: 2019-03-15

## 2019-03-15 ENCOUNTER — OFFICE VISIT (OUTPATIENT)
Dept: OTOLARYNGOLOGY | Facility: CLINIC | Age: 39
End: 2019-03-15
Payer: COMMERCIAL

## 2019-03-15 ENCOUNTER — HOME INFUSION (PRE-WILLOW HOME INFUSION) (OUTPATIENT)
Dept: PHARMACY | Facility: CLINIC | Age: 39
End: 2019-03-15

## 2019-03-15 VITALS
BODY MASS INDEX: 33.59 KG/M2 | HEIGHT: 71 IN | SYSTOLIC BLOOD PRESSURE: 126 MMHG | HEART RATE: 90 BPM | RESPIRATION RATE: 16 BRPM | WEIGHT: 239.9 LBS | DIASTOLIC BLOOD PRESSURE: 75 MMHG

## 2019-03-15 DIAGNOSIS — H70.091: ICD-10-CM

## 2019-03-15 DIAGNOSIS — G51.0 FACIAL NERVE PARESIS: Primary | ICD-10-CM

## 2019-03-15 LAB — INTERPRETATION ECG - MUSE: NORMAL

## 2019-03-15 ASSESSMENT — MIFFLIN-ST. JEOR: SCORE: 2035.31

## 2019-03-15 ASSESSMENT — PAIN SCALES - GENERAL: PAINLEVEL: NO PAIN (0)

## 2019-03-15 NOTE — NURSING NOTE
Chief Complaint   Patient presents with     Consult     facial numbness past 2 weeks, mastoiditis     Jessica Ackerman LPN

## 2019-03-15 NOTE — PATIENT INSTRUCTIONS
You will need  to schedule a follow up appointment in 3-4 weeks with a new hearing test.     If you have any urgent needs please contact [phone] and ask for the ENT resident on call to be paged.       Please call our clinic for any questions,concerns,or worsening symptoms.      Clinic #[phone]       Option 3  for the ENT Care Team.       Option 1 for scheduling.    Meme CONDE RNCC  [phone]

## 2019-03-15 NOTE — LETTER
Date:March 18, 2019      Patient was self referred, no letter generated. Do not send.        [facility] Health Information

## 2019-03-15 NOTE — DISCHARGE INSTRUCTIONS
Your EKG, CT chest and blood work is all normal.     Follow up with your Primary care doctor and your ENT doctor.     Return to the ER if any other problems/concerns.

## 2019-03-15 NOTE — LETTER
3/15/2019       RE: Reuben Wilder  [address] [address]     Dear Colleague,    Thank you for referring your patient, Reuben Wilder, to the [facility] EAR NOSE AND THROAT at [facility]. Please see a copy of my visit note below.    HISTORY OF PRESENT ILLNESS:  I had the pleasure of seeing Mr. Reuben Wilder today in follow up in the [facility] for otomastoiditis and facial paresis.  This is follow up to his recent hospitalization.  Mr. Wilder is a 38-year-old man who was admitted six days ago for right otomastoiditis with facial paresis.  We treated him with intravenous antibiotics, steroids, a myringotomy and tube placement.  Cultures grew only coag-negative staph and candida.  Infectious Disease has directed antibiotic care, and he is on outpatient vancomycin and ceftriaxone through a PICC line.      Mr. Wilder reports that he has started over the last day or two to note perhaps that his face is feeling a bit better and that he may have slightly more motion in his mid-face than he did previously.  He does not have eye dryness or eye irritation symptoms.  He feels that his right ear hearing is still slightly muffled but not as muffled as it was when he was in the hospital.  He continues to note some drainage out of his ear canal when lying down at night.  He was having dizziness during the hospital stay but that also is no longer present.      He did develop some respiratory symptoms and came to the Emergency Department here at our hospital last night.  Additional imaging was performed and confirmed that there was no complication associated with the PICC line.  He was discharged by the Emergency Department, I believe, without an otolaryngology consultation.  He is feeling better now from this standpoint as well.      Patient Supplied Answers to Review of Systems  [facility] ENT ROS 3/15/2019   Neurology Dizzy spells, Numbness, Headache   Psychology  Frequently feeling anxious   Ears, Nose, Throat Hearing loss, Ear pain, Ringing/noise in ears   Endocrine Frequent urination     PHYSICAL EXAMINATION:  He was resting comfortably in the chair breathing without stridor or stertor.  On the facial nerve examination, he indeed looks more symmetric at rest and has developed more of a right nasolabial fold compared to the left side.  He is able to activate to some extent all facial nerve branches although all activation is less than on the left side.  Thus, he has brow elevation slightly less on the right than the left.  He can completely close his eye with gentle eye closure effort.  There is no scleral show which is an improvement.  He now can achieve some nasal wrinkle and more lateral excursion of the oral commissure albeit slightly less than compared to the other side.  He still when puffing his cheeks out cannot keep all of the air in.  The ears were examined with the microscope.  Left ear is normal.  On the right, the tube is in place in the inferior aspect of the tympanic membrane.  There was a lot of Ciprodex debris and some mucoid material around it.  I suctioned all this away, but then in suctioning through the tube, no material came out from the middle ear space which is encouraging.  The 512 Hz tuning fork lateralizes to the right ear.  Air conduction is louder than bone on the right.  This is an improvement from when bone conduction was louder than air conduction in the hospital.      I reviewed the labs that were done when he was in the Emergency Department last night, and his white count is now down to less than 6.  I saw the CT report showing that he did not have a pulmonary embolus.      IMPRESSION AND PLAN:  Mr. Wilder is a 38-year-old man who is now approximately one week status post initiation of treatment for right otomastoiditis complicated by facial nerve paresis.  He is showing some clinical improvement which is encouraging. He is tolerating the  medications well so far.  He certainly notices the challenges of IV antibiotics.  He has follow up with Infectious Disease Clinic next week.  Given that he continues to make progress with his facial nerve function, we will stick with the plan for a 2 week course of high dose steroids and then the antibiotics as outlined by Infectious Disease recommendations (we will defer to their further recommendations).  I will plan to see him back at the conclusion of the antibiotic course.  Certainly, if he has worsening of facial nerve function or new symptoms including worsening drainage, I would like him to contact us and be seen sooner.     Marlen Villalta MD  Otology & Neurotology  [facility]            Again, thank you for allowing me to participate in the care of your patient.      Sincerely,    Marlen Villalta MD

## 2019-03-15 NOTE — PROGRESS NOTES
HISTORY OF PRESENT ILLNESS:  I had the pleasure of seeing Mr. Reuben Wilder today in follow up in the [facility] for otomastoiditis and facial paresis.  This is follow up to his recent hospitalization.  Mr. Wilder is a 38-year-old man who was admitted six days ago for right otomastoiditis with facial paresis.  We treated him with intravenous antibiotics, steroids, a myringotomy and tube placement.  Cultures grew only coag-negative staph and candida.  Infectious Disease has directed antibiotic care, and he is on outpatient vancomycin and ceftriaxone through a PICC line.      Mr. Wilder reports that he has started over the last day or two to note perhaps that his face is feeling a bit better and that he may have slightly more motion in his mid-face than he did previously.  He does not have eye dryness or eye irritation symptoms.  He feels that his right ear hearing is still slightly muffled but not as muffled as it was when he was in the hospital.  He continues to note some drainage out of his ear canal when lying down at night.  He was having dizziness during the hospital stay but that also is no longer present.      He did develop some respiratory symptoms and came to the Emergency Department here at our hospital last night.  Additional imaging was performed and confirmed that there was no complication associated with the PICC line.  He was discharged by the Emergency Department, I believe, without an otolaryngology consultation.  He is feeling better now from this standpoint as well.      Patient Supplied Answers to Review of Systems   ENT ROS 3/15/2019   Neurology Dizzy spells, Numbness, Headache   Psychology Frequently feeling anxious   Ears, Nose, Throat Hearing loss, Ear pain, Ringing/noise in ears   Endocrine Frequent urination     PHYSICAL EXAMINATION:  He was resting comfortably in the chair breathing without stridor or stertor.  On the facial nerve examination, he indeed looks more  symmetric at rest and has developed more of a right nasolabial fold compared to the left side.  He is able to activate to some extent all facial nerve branches although all activation is less than on the left side.  Thus, he has brow elevation slightly less on the right than the left.  He can completely close his eye with gentle eye closure effort.  There is no scleral show which is an improvement.  He now can achieve some nasal wrinkle and more lateral excursion of the oral commissure albeit slightly less than compared to the other side.  He still when puffing his cheeks out cannot keep all of the air in.  The ears were examined with the microscope.  Left ear is normal.  On the right, the tube is in place in the inferior aspect of the tympanic membrane.  There was a lot of Ciprodex debris and some mucoid material around it.  I suctioned all this away, but then in suctioning through the tube, no material came out from the middle ear space which is encouraging.  The 512 Hz tuning fork lateralizes to the right ear.  Air conduction is louder than bone on the right.  This is an improvement from when bone conduction was louder than air conduction in the hospital.      I reviewed the labs that were done when he was in the Emergency Department last night, and his white count is now down to less than 6.  I saw the CT report showing that he did not have a pulmonary embolus.      IMPRESSION AND PLAN:  Mr. Wilder is a 38-year-old man who is now approximately one week status post initiation of treatment for right otomastoiditis complicated by facial nerve paresis.  He is showing some clinical improvement which is encouraging. He is tolerating the medications well so far.  He certainly notices the challenges of IV antibiotics.  He has follow up with Infectious Disease Clinic next week.  Given that he continues to make progress with his facial nerve function, we will stick with the plan for a 2 week course of high dose steroids and  then the antibiotics as outlined by Infectious Disease recommendations (we will defer to their further recommendations).  I will plan to see him back at the conclusion of the antibiotic course.  Certainly, if he has worsening of facial nerve function or new symptoms including worsening drainage, I would like him to contact us and be seen sooner.     Marlen Villalta MD  Otology & Neurotology  [facility]

## 2019-03-16 ENCOUNTER — HOME INFUSION (PRE-WILLOW HOME INFUSION) (OUTPATIENT)
Dept: PHARMACY | Facility: CLINIC | Age: 39
End: 2019-03-16

## 2019-03-16 LAB
BACTERIA SPEC CULT: NORMAL
BACTERIA SPEC CULT: NORMAL
BUN SERPL-MCNC: 13 MG/DL (ref 7–30)
CREAT SERPL-MCNC: 0.84 MG/DL (ref 0.66–1.25)
GFR SERPL CREATININE-BSD FRML MDRD: >90 ML/MIN/{1.73_M2}
Lab: NORMAL
SPECIMEN SOURCE: NORMAL
VANCOMYCIN SERPL-MCNC: 13.8 MG/L

## 2019-03-16 PROCEDURE — 80202 ASSAY OF VANCOMYCIN: CPT | Performed by: INTERNAL MEDICINE

## 2019-03-16 PROCEDURE — 82565 ASSAY OF CREATININE: CPT | Performed by: INTERNAL MEDICINE

## 2019-03-16 PROCEDURE — 84520 ASSAY OF UREA NITROGEN: CPT | Performed by: INTERNAL MEDICINE

## 2019-03-18 ENCOUNTER — HOME INFUSION (PRE-WILLOW HOME INFUSION) (OUTPATIENT)
Dept: PHARMACY | Facility: CLINIC | Age: 39
End: 2019-03-18

## 2019-03-18 NOTE — PROGRESS NOTES
This is a recent snapshot of the patient's [facility] medical record.  For current drug dose and complete information and questions, call [phone] or In Basket pool, [facility] home infusion ([id number])  CSN Number:  [id number]

## 2019-03-18 NOTE — PROGRESS NOTES
This is a recent snapshot of the patient's [facility] medical record.  For current drug dose and complete information and questions, call [phone] or In Basket pool, [facility] home infusion ([id number])  CSN Number:  [id number]

## 2019-03-19 NOTE — PROGRESS NOTES
This is a recent snapshot of the patient's [facility] medical record.  For current drug dose and complete information and questions, call [phone] or In Basket pool, fv home infusion ([id number])  CSN Number:  [id number]

## 2019-03-20 ENCOUNTER — OFFICE VISIT (OUTPATIENT)
Dept: INFECTIOUS DISEASES | Facility: CLINIC | Age: 39
End: 2019-03-20
Attending: INTERNAL MEDICINE
Payer: COMMERCIAL

## 2019-03-20 ENCOUNTER — HOME INFUSION (PRE-WILLOW HOME INFUSION) (OUTPATIENT)
Dept: PHARMACY | Facility: CLINIC | Age: 39
End: 2019-03-20

## 2019-03-20 VITALS
BODY MASS INDEX: 33.74 KG/M2 | OXYGEN SATURATION: 96 % | SYSTOLIC BLOOD PRESSURE: 144 MMHG | DIASTOLIC BLOOD PRESSURE: 86 MMHG | WEIGHT: 241 LBS | TEMPERATURE: 98.5 F | HEART RATE: 88 BPM | HEIGHT: 71 IN

## 2019-03-20 DIAGNOSIS — G51.0 FACIAL NERVE PALSY, SECONDARY: ICD-10-CM

## 2019-03-20 DIAGNOSIS — Z86.14 HISTORY OF MRSA INFECTION: ICD-10-CM

## 2019-03-20 DIAGNOSIS — Z79.2 RECEIVING INTRAVENOUS ANTIBIOTIC TREATMENT AT HOME: ICD-10-CM

## 2019-03-20 DIAGNOSIS — M86.68 CHRONIC OSTEOMYELITIS OF SKULL (H): ICD-10-CM

## 2019-03-20 DIAGNOSIS — H66.91 OTITIS OF RIGHT EAR: ICD-10-CM

## 2019-03-20 DIAGNOSIS — H70.91 MASTOIDITIS OF RIGHT SIDE: Primary | ICD-10-CM

## 2019-03-20 PROCEDURE — G0463 HOSPITAL OUTPT CLINIC VISIT: HCPCS | Mod: ZF

## 2019-03-20 RX ORDER — CIPROFLOXACIN 500 MG/1
500 TABLET, FILM COATED ORAL 2 TIMES DAILY
Qty: 36 TABLET | Refills: 0 | Status: SHIPPED | OUTPATIENT
Start: 2019-03-20 | End: 2019-04-07

## 2019-03-20 RX ORDER — DOXYCYCLINE HYCLATE 100 MG
100 TABLET ORAL 2 TIMES DAILY
Status: CANCELLED | OUTPATIENT
Start: 2019-03-20

## 2019-03-20 RX ORDER — DOXYCYCLINE HYCLATE 100 MG
100 TABLET ORAL 2 TIMES DAILY
Qty: 36 TABLET | Refills: 0 | Status: SHIPPED | OUTPATIENT
Start: 2019-03-20 | End: 2019-04-07

## 2019-03-20 ASSESSMENT — MIFFLIN-ST. JEOR: SCORE: 2040.3

## 2019-03-20 ASSESSMENT — PAIN SCALES - GENERAL: PAINLEVEL: NO PAIN (0)

## 2019-03-20 NOTE — PATIENT INSTRUCTIONS
- Stop IV Vancomycin and Ceftriaxone  - Start Doxycycline + Ciprofloxacin  - Call us if any tendon pain or other side effects with the Cirpofloxacin  - Ok to remove PICC line   - Follow up with ENT at the end of treatment   - Follow up with us in 3 weeks

## 2019-03-21 ENCOUNTER — HOME INFUSION (PRE-WILLOW HOME INFUSION) (OUTPATIENT)
Dept: PHARMACY | Facility: CLINIC | Age: 39
End: 2019-03-21

## 2019-03-21 NOTE — PROGRESS NOTES
Infectious Disease Clinic Note: Established patient    Patient:  Reuben Wilder, Date of birth 1980, Medical record number [id number]  Date of Visit:  3/20/2019  Reason for consult: Hospital follow up of otomastoiditis         Assessment and Recommendations:   ID Problem List:  1. Otomastoiditis following ear infection  2. 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 CT of temporal bones with otomastoiditis and tegmentum mastoideum and tympani dehiscence that could not be excluded. No clinical concern for meningitis.  4. Hx MRSA last year near L ear, boil/pimple a few months ago near R ear  5. Drug reaction, possibly red man syndrome related to vancomycin    Discussion:  Patient is a 38 year old here for hospital follow up of otomastoiditis, which was associated with R facial nerve palsy, s/p myringotomy and tube placement. Additionally, with question on imaging of tegmentum tympani dehiscence which is in close proximity to the CNS, but no symptoms or signs of meningitis. Cultures with CoNS S to Vancomycin, Doxycycline and Gentamicin, as well as Candida parapsilosis ( No susceptibilties available). Now on Day 12 of IV  Vanc and Ceftriaxone. Plan per ID inpatient was to likely change to oral antibiotics based on culture results after 2 weeks of IV antibiotics to complete ~4 weeks of therapy for mastoiditis. Vancomycin held after last night 10 pm dose in the setting of redness and swelling of his face and redness over his body.This was most likely due to Red man syndrome secondary to too quick vancomycin administration. Redness and swelling persist, advised patient that these should be expected to go away usually within 3 days or so. He is on prednisone for his facial palsy. Ear pain, tinnitus and hearing loss have improved but not completely. Dizziness has resolved. At recent ENT follow up improvement noted on microscopic exam and tuning fork test.  Facial palsy also with improvement, with just a slight droop  noted.     He spiked a fever to 101 last night, improved after advil, and again at 100 this morning. His daughter has been diagnosed with influenza, and is being treated with Tamiflu, and so is his wife. Patient without any respiratory symptoms or worsening fatigue, but will call him early next week to ensure he has not developed any flu symptoms.     At this point, it would be ok to switch to PO antibiotics. His CoNs is sensitive to Doxycycline. Discussed Ciprofloxacin for gram negative coverage, which patient initially expressed concern about side effects for. Discussed other options including Levaquin, Cefpodoxime, but considering that he developed the rash while on Ceftriaxone, ultimately patient decided to go with Ciprofloxacin. Advised to call us back if he develops any side effects on the Cipro. Considering clinical improvement without antifungal, will plan to not treat the Candida parapsilosis at this time as this was likely a contaminant.      Recommendations:  - Stop IV Vancomycin and Ceftriaxone  - Start Doxycycline 100 mg BID + Ciprofloxacin 500 mg BID to finish total 4 weeks of treatment  - Patient advised to call us in the event of any tendon pain or other side effects with Cirpofloxacin  - Ok to remove PICC line   - Will call him early next week to ensure he has not developed any flu symptoms.  - Follow up with ENT at the end of treatment   - Follow up with us in 3 weeks    Plan of care discussed with Staff ID Physician Dr Jorge Riddle  PGY4 Infectious Diseases Fellow  Pager: [phone]       History of Present Illness:      Patient is a 38 year old with no significant pmh who is here for hospital follow up of otomastoiditis    Patient was admitted 3/9 - 3/11/2019 with otomastoiditis of right side, complicated by facial nerve palsy. He was in his usual state of health until about 10 days PTA when he developed a fever and ear pain and was found to have an ear infection. He was  placed on amoxicillin for a week which he reports finishing and then cefdinir for 3 days. However he then developed a R facial palsy with R facial paralysis and difficulty closing his R eye completely. He also had difficulty hearing, tinnitus and dizziness. He went to urgent care and was sent to the ED. There ENT saw him and placed him on IV ceftriaxone for otomastoiditis and he was admitted. His temporal bone CT showed otomastoiditis and tegmentum mastoideum and tympani dehiscence could not be excluded.    He was found to have a R effusion from his ear which was drained with a tube placed 3/9.  Cultures were sent and have grown CoNS S to Vancomycin, Doxycycline and Gentamicin, as well as Candida parapsilosis ( No susceptibilties available).  Additionally, there was a question on imaging of tegmentum tympani dehiscence which is in close proximity to the CNS. ID decided to place a PICC and treat with 2 weeks of IV Vancomycin and Ceftriaxone at CNS dosing. Given his lack of symptoms of meningitis and the plan to treat with CNS dosing regardless of results, they did not pursue an LP. Plan was to likely change to oral antibiotics based on culture results after 2 weeks of IV antibiotics to complete ~4 weeks of therapy for mastoiditis.     Today he has returned at Day 12 of therapy. Last night at 10 pm after administration of Vancomycin he developed redness and swelling of his face and redness over his body. His dose had been increased at the time to 1850 from 1750 after his last Vanc trough was noted to be at 13.8 on 3/16. Patient also noted that this time it was administered faster than usual. Vanc has been held since but he has been receiving Ceftriaxone today. He has noticed no change in his redness and swelling, and wonders how long it will be before the symptoms resolve. No itching or SOB or tongue or throat swelling He also spiked a fever to 101 last night, improved after advil, and again at 100 this morning. His  daughter has been diagnosed with influenza, and is being treated with Tamiflu, and so is his wife. Patient denies any headache, runny nose, sore throat, sinus congestion, chest pain, cough, SOB, abdominal pain, nausea, vomiting, diarrhea, confusion, pain behind the ear, dysuria, neck pain or joint pains. He has been fatigued but attributes it to sleep deprivation from antibiotic dosing. This is since his discharge and has not worsened.     His ear pain, tinnitus and hearing loss have improved but not completely. Dizziness has resolved. He recently saw ENT in follow up on 3/15.The ears were examined with  Microscope at the time, left ear is normal. On the right, the tube was noted to be in place in the inferior aspect of the tympanic membrane.  There was a lot of Ciprodex debris and some mucoid material around it, which was suctioned but when suctioning through the tube, no material came out from the middle ear space which was noted to be encouraging.  ENT also noted that air conduction was louder than bone on the right, which was an improvement from when bone conduction was louder than air conduction in the hospital. Facial palsy also with improvement, with just a slight droop noted.     He was diagnosed with MRSA last year after he had a boil near his L ear. He reports a few months ago he had a boil/pimple by his R ear which he popped and it went away.          Review of Systems:   10 point ROS negative other than above     PMH:  No significant PMH    FH:   Wife and Daughter with the flu  Daughter with recent ear infection    Allergies: NKDA    SH:   Social History     Tobacco Use     Smoking status: Never Smoker     Smokeless tobacco: Never Used   Substance Use Topics     Alcohol use: No     Frequency: Never     Drug use: No            Current Antimicrobials:   Vancomycin 3/9-present  Ceftriaxone 3/9- present         Physical Exam:   Ranges for vital signs:    Temp:  [98.5  F (36.9  C)] 98.5  F (36.9  C)  Pulse:   [88] 88  BP: (144)/(86) 144/86  SpO2:  [96 %] 96 %    Exam:  GENERAL:  Well-developed, well-nourished, sitting in bed in no acute distress. Mild right facial droop.   ENT:  Head is normocephalic, atraumatic. Anterior oropharynx without ertyhema  Left ear: Normal EAC, TM  Right ear: Some edema of EAC, TM with some debris, not well visualized  No postauricular erythema or ttp bilaterally  No sinus ttp  EYES:  Eyes have anicteric sclerae.    NECK:  Supple.  LUNGS:  Normal respiratory effort.   ABDOMEN:  Non-distended.   EXT: Extremities without visible edema.   SKIN:  No acute rashes.  PICC in place without any surrounding erythema  NEUROLOGIC:  Mild right nasolabial flattening+. No other gross focal deficits         Laboratory Data:     Lab Results   Component Value Date    WBC 5.9 03/14/2019     Lab Results   Component Value Date    RBC 4.97 03/14/2019     Lab Results   Component Value Date    HGB 14.9 03/14/2019     Lab Results   Component Value Date    HCT 43.7 03/14/2019     No components found for: MCT  Lab Results   Component Value Date    MCV 88 03/14/2019     Lab Results   Component Value Date    MCH 30.0 03/14/2019     Lab Results   Component Value Date    MCHC 34.1 03/14/2019     Lab Results   Component Value Date    RDW 12.6 03/14/2019     Lab Results   Component Value Date     03/14/2019     Last Comprehensive Metabolic Panel:  Sodium   Date Value Ref Range Status   03/14/2019 140 133 - 144 mmol/L Final     Potassium   Date Value Ref Range Status   03/14/2019 3.9 3.4 - 5.3 mmol/L Final     Chloride   Date Value Ref Range Status   03/14/2019 104 94 - 109 mmol/L Final     Carbon Dioxide   Date Value Ref Range Status   03/14/2019 26 20 - 32 mmol/L Final     Anion Gap   Date Value Ref Range Status   03/14/2019 9 3 - 14 mmol/L Final     Glucose   Date Value Ref Range Status   03/14/2019 119 (H) 70 - 99 mg/dL Final     Urea Nitrogen   Date Value Ref Range Status   03/16/2019 13 7 - 30 mg/dL Final      Creatinine   Date Value Ref Range Status   03/16/2019 0.84 0.66 - 1.25 mg/dL Final     GFR Estimate   Date Value Ref Range Status   03/16/2019 >90 >60 mL/min/[1.73_m2] Final     Comment:     Non  GFR Calc  Starting 12/18/2018, serum creatinine based estimated GFR (eGFR) will be   calculated using the Chronic Kidney Disease Epidemiology Collaboration   (CKD-EPI) equation.       Calcium   Date Value Ref Range Status   03/14/2019 8.6 8.5 - 10.1 mg/dL Final     Bilirubin Total   Date Value Ref Range Status   03/12/2019 0.3 0.2 - 1.3 mg/dL Final     Alkaline Phosphatase   Date Value Ref Range Status   03/12/2019 66 40 - 150 U/L Final     ALT   Date Value Ref Range Status   03/12/2019 28 0 - 70 U/L Final     AST   Date Value Ref Range Status   03/12/2019 13 0 - 45 U/L Final     CRP Inflammation   Date Value Ref Range Status   03/12/2019 <2.9 0.0 - 8.0 mg/L Final     Microbiology:    3/9/19  R middle ear culture  Gram Stain showed rare GPCs in the setting of rare WBCs, predmoninantly PMNs  Culture: CoNS, S to vancomycin, gentamicin and doxycycline;  Candida parapsilosis complex ( No susceptibility testing done)         Imaging:     CT HEAD W/O CONTRAST 3/9/2019 6:12 PM     Provided History: right ear pain and new bell's palsy, eval for  mastoiditis     Comparison: None available.     Technique: Using multidetector thin collimation helical acquisition  technique, axial, coronal and sagittal CT images from the skull base  to the vertex were obtained without intravenous contrast.      Findings:    No intracranial hemorrhage, mass effect, or midline shift. The  ventricles are proportionate to the cerebral sulci. The gray to white  matter differentiation of the cerebral hemispheres is preserved. The  basal cisterns are patent.     The visualized paranasal sinuses are clear. Near complete  opacification of right mastoid air cells. The left mastoid air cells  are clear.                                                                       Impression:  1.  Near complete opacification of right mastoid air cells, compatible  with mastoiditis.  2.   No acute intracranial pathology.    CT TEMPORAL BONES WO & W CONTRAST 3/9/2019 8:35 PM     Provided History: ear pain and right facial droop     Comparison: Head CT same day      Technique: Using multidetector thin collimation helical acquisition  technique, axial and coronal thin section CT images were reconstructed  through both temporal bones. Additional reconstructions performed in  the planes of Poschl and Stenver were also obtained. Images were  reviewed in a bone window.     Findings:   Limited evaluation due to reconstruction images.     Right temporal bone: The mastoid air cells are near complete  opacified. There is no debris in the external auditory canal. The  tympanic membrane is intact. There is fluid or soft tissue thickening  in the right middle ear cavity. The bony ossicles are intact and in  normal alignment. The epitympanum is clear. The bony scutum is sharp.  The internal auditory canal and the labyrinthine structures are within  normal limits. Osseous irregularity along the tegmen, dehiscence  cannot be excluded.     Left temporal bone: The mastoid air cells are clear. There is no  debris in the external auditory canal. The tympanic membrane is  intact. There is no fluid or soft tissue thickening in the left middle  ear cavity. The bony ossicles are intact and in normal alignment. The  epitympanum is clear. The bony scutum is sharp. The internal auditory  canal and the labyrinthine structures are within normal limits. The  facial nerve canal appears normal along its course.                                                                   Impression:    1.  Right otomastoiditis. Tegmentum mastoideum and tympani dehiscence  cannot be excluded.  2.  Left temporal bone is unremarkable.

## 2019-03-21 NOTE — PROGRESS NOTES
This is a recent snapshot of the patient's [facility] medical record.  For current drug dose and complete information and questions, call [phone] or In Basket pool, fv home infusion ([id number])  CSN Number:  [id number]

## 2019-03-22 NOTE — PROGRESS NOTES
This is a recent snapshot of the patient's [facility] medical record.  For current drug dose and complete information and questions, call [phone] or In Basket pool, fv home infusion ([id number])  CSN Number:  [id number]

## 2019-03-23 NOTE — PROGRESS NOTES
Infectious Disease Clinic Staff Note: Mr. Wilder was seen, examined, and the case was discussed with Dr. Riddle, ID Fellow -- I agree with her interval history and examination, assessment and plan in this post-hospitalization outpatient ID Progress note. This note reflects my observations and opinions, and the plan outlined fully reflects my approach. I have reviewed the available history, radiology, laboratory results, and reports with the Fellow.

## 2019-03-25 ENCOUNTER — TELEPHONE (OUTPATIENT)
Dept: INFECTIOUS DISEASES | Facility: CLINIC | Age: 39
End: 2019-03-25

## 2019-03-25 DIAGNOSIS — H70.91 MASTOIDITIS OF RIGHT SIDE: Primary | ICD-10-CM

## 2019-03-25 RX ORDER — CEFPODOXIME PROXETIL 200 MG/1
200 TABLET, FILM COATED ORAL 2 TIMES DAILY
Qty: 28 TABLET | Refills: 0 | Status: SHIPPED | OUTPATIENT
Start: 2019-03-25 | End: 2019-04-08

## 2019-03-25 NOTE — TELEPHONE ENCOUNTER
Called patient back - he reports pain in his knees and ankles since starting the Ciprofloxacin. Will stop Ciprofloxacin at this time. Will start Cefpodoxime 200 mg BID and continue Doxycycline 100 mg BID to complete his total 4 week course of antibiotics.     Also enquired about his fevers - he has not had any since last visit to us, and denies any flu-like symptoms.    Advised to follow up in ID clinic at the end of treatment.    Plan of care d/w Staff ID Physician Dr Jorge Riddle  PGY4 Infectious Diseases Fellow  Pager: [phone]

## 2019-03-25 NOTE — TELEPHONE ENCOUNTER
Called pt who reports he started Ciprofloxacin on 3/20 and on 3/23 he began feeling stiff and sore joints in his knees and ankles. He reports it is not unbearable but he wants to make sure Dr Nice knows and says it's OK. Dr Nice updated.  Wilma Benjamin RN

## 2019-03-25 NOTE — TELEPHONE ENCOUNTER
[facility] Call Center    Phone Message    May a detailed message be left on voicemail: yes    Reason for Call: Other: Pt states that he is having side effects from the Rx Cipro. Pt is experiencing pain in joints (knees and ankles) since Saturday. Pt would like to discuss this with someone    Action Taken: Message routed to:  [facility] ([facility]): id

## 2019-03-26 NOTE — TELEPHONE ENCOUNTER
Per Dr Nice via staff note, pt to stop Ciprofloxacin and start Cefpodoxime (200 mg) by mouth 2 times daily for 14 days. Dr Nice report she called pt and relayed this information.  Wilma Benjamin RN

## 2019-03-29 ENCOUNTER — TELEPHONE (OUTPATIENT)
Dept: OTOLARYNGOLOGY | Facility: CLINIC | Age: 39
End: 2019-03-29

## 2019-03-29 NOTE — TELEPHONE ENCOUNTER
Spoke with pt regarding change in symptoms. Pt states he started to notice mild pain and a small amount of drainage from the right ear over the past 3 days. Pt states the pain is below his ear and behind his jaw. States he feels a dull ache of discomfort throughout the day which is barely noticeable. States when he belches the pain increases. Rates this pain 2 on a 0-10 scale. States he has noted some crusting in the right ear and wetness. States he thinks this drainage is clear and odorless. States it has not dripped out of his ear at all. Pt states his allergies have worsened over the past few days as well. Explained that he should continue to monitor the pain and drainage and contact the clinic if symptoms worsen. Explained that his concerns would be reviewed with Dr. Villalta and we would contact him with any additional recommendations. Meme CONDE RNCC

## 2019-03-29 NOTE — TELEPHONE ENCOUNTER
Spoke with pt and relayed per Dr. Villalta pt should monitor drainage and pain and contact clinic if symptom worsen. Pt stats understanding and denies any additional questions or concerns. Meme CONDE RNCC

## 2019-03-29 NOTE — TELEPHONE ENCOUNTER
[facility] Call Center    Phone Message    May a detailed message be left on voicemail: yes    Reason for Call: Symptoms or Concerns     If patient has red-flag symptoms, warm transfer to triage line    Current symptom or concern: ear pain, both - notices it more after he burps.  Also a small amount of drainage.     Symptoms have been present for:  3 day(s)    Has patient previously been seen for this? No        Action Taken: Message routed to:  [facility] ([facility]): oliverio

## 2019-04-08 LAB
FUNGUS SPEC CULT: ABNORMAL
FUNGUS SPEC CULT: ABNORMAL
SPECIMEN SOURCE: ABNORMAL

## 2019-04-16 ENCOUNTER — OFFICE VISIT (OUTPATIENT)
Dept: AUDIOLOGY | Facility: CLINIC | Age: 39
End: 2019-04-16
Payer: COMMERCIAL

## 2019-04-16 ENCOUNTER — OFFICE VISIT (OUTPATIENT)
Dept: OTOLARYNGOLOGY | Facility: CLINIC | Age: 39
End: 2019-04-16
Payer: COMMERCIAL

## 2019-04-16 VITALS
HEART RATE: 90 BPM | BODY MASS INDEX: 33.19 KG/M2 | WEIGHT: 238 LBS | DIASTOLIC BLOOD PRESSURE: 82 MMHG | SYSTOLIC BLOOD PRESSURE: 134 MMHG | OXYGEN SATURATION: 97 %

## 2019-04-16 DIAGNOSIS — H70.091: Primary | ICD-10-CM

## 2019-04-16 DIAGNOSIS — G51.0 FACIAL NERVE PARESIS: ICD-10-CM

## 2019-04-16 DIAGNOSIS — H91.90 HEARING LOSS, UNSPECIFIED HEARING LOSS TYPE, UNSPECIFIED LATERALITY: Primary | ICD-10-CM

## 2019-04-16 DIAGNOSIS — H90.11 CONDUCTIVE HEARING LOSS OF RIGHT EAR WITH UNRESTRICTED HEARING OF LEFT EAR: Primary | ICD-10-CM

## 2019-04-16 ASSESSMENT — PAIN SCALES - GENERAL: PAINLEVEL: NO PAIN (0)

## 2019-04-16 NOTE — PATIENT INSTRUCTIONS
1. You were seen in the ENT Clinic today by Dr. Villalta.  If you have any questions or concerns after your appointment, please call   - ENT Clinic: [phone]  - [facility] (Dr. Villalta Nurse): [phone]    2. Plan to return to clinic in 6 months with Dr. Adams Natice Schwab, RN  [facility] Otolaryngology  [phone]

## 2019-04-16 NOTE — LETTER
4/16/2019       RE: Reuben Wilder  [address] [address]     Dear Colleague,    Thank you for referring your patient, Reuben Wilder, to the [facility] EAR NOSE AND THROAT at [facility]. Please see a copy of my visit note below.    HISTORY OF PRESENT ILLNESS:  I had the pleasure of seeing Mr. Reuben Wilder today in follow up in the [facility] for otomastoiditis and facial paresis. Mr. Wilder is a 39-year-old man who was admitted recently with right otomastoiditis with facial paresis.  We treated him with intravenous antibiotics, steroids, a myringotomy and tube placement. Cultures grew only coag-negative staph and candida.  Infectious Disease has directed antibiotic care, and he completed outpatient vancomycin and ceftriaxone through a PICC line and transitioned to oral antibiotics at ID's direction.    He has experienced recovery of facial nerve function and feels like his ear is less full.  He has no drainage or pain. He has no dizziness.       Patient Supplied Answers to Review of Systems  [facility] ROS 3/15/2019   Neurology Dizzy spells, Numbness, Headache   Psychology Frequently feeling anxious   Ears, Nose, Throat Hearing loss, Ear pain, Ringing/noise in ears   Endocrine Frequent urination     PHYSICAL EXAMINATION:  He was resting comfortably in the chair breathing without stridor or stertor.  On the facial nerve examination, he has full activation bilaterally with no residual paresis.  Tube is patent in right TM. The 512 Hz tuning fork lateralizes to the right ear.  Air conduction is louder than bone on the right.     IMPRESSION AND PLAN:  Mr. Wilder is a 39-year-old man who has now recovered from right otomastoiditis with facial paresis.  He will return in 6 months for tube check and audiogram, sooner if symptoms recur.    Marlen Villalta MD  Otology & Neurotology  [facility]      Again, thank you for allowing me to  participate in the care of your patient.      Sincerely,    Marlen Villalta MD

## 2019-04-16 NOTE — PROGRESS NOTES
AUDIOLOGY REPORT    SUMMARY: Audiology visit completed. See audiogram for results.      RECOMMENDATIONS: Follow-up with ENT.      Ion Salinas, CCC-A  Licensed Audiologist  [place] #[id number]

## 2019-04-16 NOTE — LETTER
Date:May 17, 2019      Patient was self referred, no letter generated. Do not send.        [facility] Health Information

## 2019-05-17 NOTE — PROGRESS NOTES
HISTORY OF PRESENT ILLNESS:  I had the pleasure of seeing Mr. Reuben Wilder today in follow up in the [facility] for otomastoiditis and facial paresis. Mr. Wilder is a 39-year-old man who was admitted recently with right otomastoiditis with facial paresis.  We treated him with intravenous antibiotics, steroids, a myringotomy and tube placement. Cultures grew only coag-negative staph and candida.  Infectious Disease has directed antibiotic care, and he completed outpatient vancomycin and ceftriaxone through a PICC line and transitioned to oral antibiotics at ID's direction.    He has experienced recovery of facial nerve function and feels like his ear is less full.  He has no drainage or pain. He has no dizziness.       Patient Supplied Answers to Review of Systems   ENT ROS 3/15/2019   Neurology Dizzy spells, Numbness, Headache   Psychology Frequently feeling anxious   Ears, Nose, Throat Hearing loss, Ear pain, Ringing/noise in ears   Endocrine Frequent urination     PHYSICAL EXAMINATION:  He was resting comfortably in the chair breathing without stridor or stertor.  On the facial nerve examination, he has full activation bilaterally with no residual paresis.  Tube is patent in right TM. The 512 Hz tuning fork lateralizes to the right ear.  Air conduction is louder than bone on the right.     IMPRESSION AND PLAN:  Mr. Wilder is a 39-year-old man who has now recovered from right otomastoiditis with facial paresis.  He will return in 6 months for tube check and audiogram, sooner if symptoms recur.    Marlen Villalta MD  Otology & Neurotology  [facility]

## 2019-07-16 ENCOUNTER — TELEPHONE (OUTPATIENT)
Dept: OTOLARYNGOLOGY | Facility: CLINIC | Age: 39
End: 2019-07-16

## 2019-07-16 NOTE — TELEPHONE ENCOUNTER
"[facility] Call Center    Phone Message    May a detailed message be left on voicemail: yes    Reason for Call: Symptoms or Concerns     If patient has red-flag symptoms, warm transfer to triage line    Current symptom or concern: Pain, pressure in right ear    Symptoms have been present for:  Approx 10 day(s)    Has patient previously been seen for this? Yes    By: Dr. Villalta    Date: Last seen 4/16/2019    Are there any new or worsening symptoms? Yes: Pt reports that he has noted some pressure in his right ear beginning around 7/4. Over the past 2-3 days, he has noted some pain and pressure building up, something that \"feels like a blockage\". Next available with Dr. Villalta is 8/27; please advise.     Action Taken: Message routed to:  [facility]): ENT  "

## 2019-07-17 NOTE — TELEPHONE ENCOUNTER
"Pt called regarding recent symptoms    Pt explained that around the 4th of July he started to feel some mild pressure and fullness in his right ear; reports minimal pain. Mild drainage but notes he is currently having a psoriasis flare up in his ear that often causes some discharge. Reports mild muffled hearing but nothing significant and it seems to clear if he \"pops\" his ear.     Explained that if he starts to experience pain or hearing fluctuations he is to call my direct line and I can get him scheduled for f/o with Dr. Villalta; pt stated understanding    Natice Schwab, RN BSN    "

## 2019-10-02 ENCOUNTER — TELEPHONE (OUTPATIENT)
Dept: OTOLARYNGOLOGY | Facility: CLINIC | Age: 39
End: 2019-10-02

## 2019-10-02 NOTE — TELEPHONE ENCOUNTER
Health Call Center    Phone Message    May a detailed message be left on voicemail: yes    Reason for Call: Symptoms or Concerns     If patient has red-flag symptoms, warm transfer to triage line    Current symptom or concern: Patient would like to update care team that he has a  right ear outer ear infection - States he was prescribed cipro drops at urgent care - [facility]. States it is the same ear that had the infection before and thought it would be a good idea for care team to know.     Action Taken: Message routed to:  [facility]): ENT

## 2019-10-16 ENCOUNTER — PRE VISIT (OUTPATIENT)
Dept: SLEEP MEDICINE | Facility: CLINIC | Age: 39
End: 2019-10-16

## 2019-10-16 NOTE — TELEPHONE ENCOUNTER
"  1.  Reason for the visit:  Consult to discuss snoring and choking while sleeping  2.  Referring provider and clinic name:  Self  3.  Previous Sleep Doctor or Pulmonlogist (clinic name)?  None noted  4.  Records, Procedures, Imaging, and Labs (see below)  No records to obtain        All NOTES from previous office visits that pertain to why they are being seen in the Sleep Center    Previous Sleep Studies, Chest CT, Echos and reports that pertain to why they are seeing Sleep Center    All Sleep records that have been done in the last 2 years that pertain to why they are seeing Sleep Center            Are they being seen for continuation of care for Cpap/Bipap/Avap/Trilogy/Dental Device? none    If yes to above Who and Where was Device issued/currently getting supplies from? na    Are you currently on \"Supplemental Oxygen\" during the day or night?   na                                                                                                                                                      Please remind pt to bring Cpap machine and ask to arrive 15 minutes early to appointment due traffic and congestion                                                 5. Pt Sleep Center Packet received Message left asking pt to arrive 30 minutes early to appointment if no packet received.        Yes: \"please make sure that you bring this to your appointment completed, either the doctor will not see you until this completed or you may be asked to reschedule your appointment.\"     No: mail or email to the pt and explain, \"please make sure that you bring this to your appointment completed, either the doctor will not see you until this completed or you may be asked to reschedule your appointment.\"     ~If pt coming early to fill packet out, ask that they come 30 minutes prior to their appointment~     6. Has the pt's medication list been updated and preferred pharmacy added?     7. Has the allergy list been reviewed?    \"Thank " "you for choosing [facility] and we look forward to seeing you at your upcoming appointment\"     "

## 2019-10-22 ENCOUNTER — OFFICE VISIT (OUTPATIENT)
Dept: OTOLARYNGOLOGY | Facility: CLINIC | Age: 39
End: 2019-10-22
Payer: COMMERCIAL

## 2019-10-22 ENCOUNTER — OFFICE VISIT (OUTPATIENT)
Dept: AUDIOLOGY | Facility: CLINIC | Age: 39
End: 2019-10-22
Payer: COMMERCIAL

## 2019-10-22 VITALS
HEART RATE: 86 BPM | BODY MASS INDEX: 33.68 KG/M2 | DIASTOLIC BLOOD PRESSURE: 82 MMHG | SYSTOLIC BLOOD PRESSURE: 125 MMHG | WEIGHT: 241.5 LBS

## 2019-10-22 DIAGNOSIS — G51.0 FACIAL NERVE PARESIS: Primary | ICD-10-CM

## 2019-10-22 DIAGNOSIS — H91.90 HEARING LOSS: ICD-10-CM

## 2019-10-22 DIAGNOSIS — Z96.22 PATENT PRESSURE EQUALIZATION (PE) TUBE, RIGHT: Primary | ICD-10-CM

## 2019-10-22 DIAGNOSIS — H91.90 HEARING LOSS: Primary | ICD-10-CM

## 2019-10-22 RX ORDER — CEPHALEXIN 500 MG/1
TABLET ORAL
Refills: 0 | COMMUNITY
Start: 2019-10-18 | End: 2019-11-05

## 2019-10-22 ASSESSMENT — PAIN SCALES - GENERAL: PAINLEVEL: NO PAIN (0)

## 2019-10-22 NOTE — LETTER
10/22/2019       RE: Reuben Wilder  [address] [address]     Dear Colleague,    Thank you for referring your patient, Reuben Wilder, to the [facility] EAR NOSE AND THROAT at [facility]. Please see a copy of my visit note below.      Neurotology Clinic      Name: Reuben Wilder  MRN: [id number]  Age: 39 year old  : 1980  10/22/2019      Chief Complaint:   Follow up      History of Present Illness:   Reuben Wilder is a 39 year old male with a history of right otomastoiditis and facial paresis, resolved following antibiotic treatment and myringotomy with tube placement, who presents for follow up.    The patient reports a few weeks ago he was diagnosed with right otitis externa and provided Ciprodex drops. He had right otalgia and pain with pressure to the area. This has since resolved. He reports feeling normal currently, denies otalgia. He reports hearing is normal and he perceives this as symmetric. Patient notes complete resolution of facial paresis, does not notice a difference between right and left.      Review of Systems:   Pertinent items are noted in HPI or as in patient entered ROS below, remainder of complete ROS is negative.    ENT ROS 10/22/2019   Neurology -   Psychology -   Ears, Nose, Throat Ear pain   Endocrine -         Physical Exam:   /82   Pulse 86   Wt 109.5 kg (241 lb 8 oz)   BMI 33.68 kg/m        Constitutional:  The patient was unaccompanied, well-groomed, and in no acute distress.     Skin: Normal:  warm and pink without rash   Neurologic: Alert and oriented x 3.  CN's III-XII within normal limits.  Voice normal.  Normal facial nerve function.   Psychiatric: The patient's affect was calm, cooperative, and appropriate.     Communication:  Normal; communicates verbally, normal voice quality.   Respiratory: Breathing comfortably without stridor or exertion of accessory muscles.    Eyes: Pupils were equal and reactive.  Extraocular  movement intact.     Ears: Pinnae and tragus non-tender.       Otologic microscope exam:  Right ear: PE tube in place in the posterior inferior aspect of the tympanic membrane, debris around the edges.  Left ear: Intact tympanic membrane, well aerated, EAC clear.      Audiogram:  AUDIOGRAM: He underwent an audiogram today. This demonstrated:  Left- thresholds within normal limits, no air-bone gaps present; 10 dB improvement 500-2000 hz. Right- borderline normal 250  Hz rising to normal hearing 500-8000 Hz; 5-10 dB improvement 500 and 2176-8722 Hz. 100% word rec. bilaterally; stable. Normal left  tymp, flat w/ large volume right and reflexes as marked.    The speech reception threshold is 5 dB on the right, 0 dB on the left, with 100% word recognition.     Assessment and Plan:  Reuben Wilder is a 39 year old male who presents for follow up about 8 months following hospitalization for right otomastoiditis and facial paresis. He had a recent episode of right otitis externa which has resolved with Ciprodex. The patient reports feeling well without current otalgia, hearing loss, and facial paresis has resolved. His right PE tube is still in place and patent, but does have some crusting debris surrounding it. Exam is otherwise healthy appearing without signs of inflammation or infection. He will follow up in 6 months to discuss possible replacement PE tube and repeat tympanogram.  We talked about the pros and cons of letting the tube come out and not replacing it.    Follow-up: Return in about 6 months (around 4/22/2020).      Marlen Villalta MD  Otology & Neurotology  [facility]    The documentation recorded by the scribe accurately reflects the services I personally performed and the decisions made by me.       Scribe Disclosure:  I, Kellen Ramirez, am serving as a scribe to document services personally performed by Marlen Villalta MD at this visit, based upon the provider's statements to me. All  documentation has been reviewed by the aforementioned provider prior to being entered into the official medical record.       Again, thank you for allowing me to participate in the care of your patient.      Sincerely,    Marlen Villalta MD

## 2019-10-22 NOTE — PROGRESS NOTES
Neurotology Clinic      Name: Reuben Wilder  MRN: [id number]  Age: 39 year old  : 1980  10/22/2019      Chief Complaint:   Follow up      History of Present Illness:   Reuben Wilder is a 39 year old male with a history of right otomastoiditis and facial paresis, resolved following antibiotic treatment and myringotomy with tube placement, who presents for follow up.    The patient reports a few weeks ago he was diagnosed with right otitis externa and provided Ciprodex drops. He had right otalgia and pain with pressure to the area. This has since resolved. He reports feeling normal currently, denies otalgia. He reports hearing is normal and he perceives this as symmetric. Patient notes complete resolution of facial paresis, does not notice a difference between right and left.      Review of Systems:   Pertinent items are noted in HPI or as in patient entered ROS below, remainder of complete ROS is negative.    ENT ROS 10/22/2019   Neurology -   Psychology -   Ears, Nose, Throat Ear pain   Endocrine -         Physical Exam:   /82   Pulse 86   Wt 109.5 kg (241 lb 8 oz)   BMI 33.68 kg/m       Constitutional:  The patient was unaccompanied, well-groomed, and in no acute distress.     Skin: Normal:  warm and pink without rash   Neurologic: Alert and oriented x 3.  CN's III-XII within normal limits.  Voice normal.  Normal facial nerve function.   Psychiatric: The patient's affect was calm, cooperative, and appropriate.     Communication:  Normal; communicates verbally, normal voice quality.   Respiratory: Breathing comfortably without stridor or exertion of accessory muscles.    Eyes: Pupils were equal and reactive.  Extraocular movement intact.     Ears: Pinnae and tragus non-tender.       Otologic microscope exam:  Right ear: PE tube in place in the posterior inferior aspect of the tympanic membrane, debris around the edges.  Left ear: Intact tympanic membrane, well aerated, EAC clear.       Audiogram:  AUDIOGRAM: He underwent an audiogram today. This demonstrated:  Left- thresholds within normal limits, no air-bone gaps present; 10 dB improvement 500-2000 hz. Right- borderline normal 250  Hz rising to normal hearing 500-8000 Hz; 5-10 dB improvement 500 and 9677-0904 Hz. 100% word rec. bilaterally; stable. Normal left  tymp, flat w/ large volume right and reflexes as marked.    The speech reception threshold is 5 dB on the right, 0 dB on the left, with 100% word recognition.     Assessment and Plan:  Reuben Wilder is a 39 year old male who presents for follow up about 8 months following hospitalization for right otomastoiditis and facial paresis. He had a recent episode of right otitis externa which has resolved with Ciprodex. The patient reports feeling well without current otalgia, hearing loss, and facial paresis has resolved. His right PE tube is still in place and patent, but does have some crusting debris surrounding it. Exam is otherwise healthy appearing without signs of inflammation or infection. He will follow up in 6 months to discuss possible replacement PE tube and repeat tympanogram.  We talked about the pros and cons of letting the tube come out and not replacing it.    Follow-up: Return in about 6 months (around 4/22/2020).      Marlen Villalta MD  Otology & Neurotology  [facility]    The documentation recorded by the scribe accurately reflects the services I personally performed and the decisions made by me.       Scribe Disclosure:  I, Kellen James, am serving as a scribe to document services personally performed by Marlen Villalta MD at this visit, based upon the provider's statements to me. All documentation has been reviewed by the aforementioned provider prior to being entered into the official medical record.

## 2019-10-22 NOTE — PROGRESS NOTES
AUDIOLOGY REPORT    SUMMARY: Audiology visit completed. See audiogram for results.      RECOMMENDATIONS: Follow-up with ENT.      Agustina Mora.  Licensed Audiologist  [place] #[id number]

## 2019-10-22 NOTE — PATIENT INSTRUCTIONS
1. You were seen in the ENT Clinic today by .  If you have any questions or concerns after your appointment, please call   - Option 1: ENT Clinic: [phone]  - Option 2: Wilma (' Nurse): [phone]    2.   Plan to return to clinic 6 months with a tympanogram.    REECE Dillon  [facility] Otolaryngology  [phone]

## 2019-10-22 NOTE — LETTER
Date:October 28, 2019      Patient was self referred, no letter generated. Do not send.        [facility] Health Information

## 2019-11-03 ENCOUNTER — HEALTH MAINTENANCE LETTER (OUTPATIENT)
Age: 39
End: 2019-11-03

## 2019-11-05 ENCOUNTER — OFFICE VISIT (OUTPATIENT)
Dept: SLEEP MEDICINE | Facility: CLINIC | Age: 39
End: 2019-11-05
Payer: COMMERCIAL

## 2019-11-05 VITALS
DIASTOLIC BLOOD PRESSURE: 77 MMHG | RESPIRATION RATE: 16 BRPM | SYSTOLIC BLOOD PRESSURE: 118 MMHG | BODY MASS INDEX: 34.16 KG/M2 | OXYGEN SATURATION: 97 % | HEIGHT: 71 IN | WEIGHT: 244 LBS | HEART RATE: 81 BPM

## 2019-11-05 DIAGNOSIS — E66.811 OBESITY (BMI 30.0-34.9): ICD-10-CM

## 2019-11-05 DIAGNOSIS — R06.83 SNORING: Primary | ICD-10-CM

## 2019-11-05 DIAGNOSIS — G47.21 CIRCADIAN RHYTHM SLEEP DISORDER, DELAYED SLEEP PHASE TYPE: ICD-10-CM

## 2019-11-05 PROCEDURE — 99205 OFFICE O/P NEW HI 60 MIN: CPT | Performed by: NURSE PRACTITIONER

## 2019-11-05 ASSESSMENT — MIFFLIN-ST. JEOR: SCORE: 2048.91

## 2019-11-05 NOTE — PATIENT INSTRUCTIONS
"MY TREATMENT INFORMATION FOR SLEEP APNEA-  Reuben Wilder    DOCTOR : JORDAN Madrig[facility]  SLEEP CENTER : [place]     MY CONTACT NUMBER: [phone]  Hst, follow up via scheduled phone call (virtual visit)  Am I having a sleep study at a sleep center?  Make sure you have an appointment for the study before you leave!    Am I having a home sleep study?  Watch this video:  [email or web address].com/watch?v=[email or web address]_[email or web address]  Please verify your insurance coverage with your insurance carrier    Frequently asked questions:  1. What is Obstructive Sleep Apnea (TIANNA)? TIANNA is the most common type of sleep apnea. Apnea means, \"without breath.\"  Apnea is most often caused by narrowing or collapse of the upper airway as muscles relax during sleep.   Almost everyone has occasional apneas. Most people with sleep apnea have had brief interruptions at night frequently for many years.  The severity of sleep apnea is related to how frequent and severe the events are.   2. What are the consequences of TIANNA? Symptoms include: feeling sleepy during the day, snoring loudly, gasping or stopping of breathing, trouble sleeping, and occasionally morning headaches or heartburn at night.  Sleepiness can be serious and even increase the risk of falling asleep while driving. Other health consequences may include development of high blood pressure and other cardiovascular disease in persons who are susceptible. Untreated TIANNA  can contribute to heart disease, stroke and diabetes.   3. What are the treatment options? In most situations, sleep apnea is a lifelong disease that must be managed with daily therapy. Medications are not effective for sleep apnea and surgery is generally not considered until other therapies have been tried. Your treatment is your choice . Continuous Positive Airway (CPAP) works right away and is the therapy that is effective in nearly everyone. An oral device to hold your jaw " forward is usually the next most reliable option. Other options include postioning devices (to keep you off your back), weight loss, and surgery including a tongue pacing device. There is more detail about some of these options below.    Important tips for using CPAP and similar devices   Know your equipment:  CPAP is continuous positive airway pressure that prevents obstructive sleep apnea by keeping the throat from collapsing while you are sleeping. In most cases, the device is  smart  and can slowly self-adjusts if your throat collapses and keeps a record every day of how well you are treated-this information is available to you and your care team.  BPAP is bilevel positive airway pressure that keeps your throat open and also assists each breath with a pressure boost to maintain adequate breathing.  Special kinds of BPAP are used in patients who have inadequate breathing from lung or heart disease. In most cases, the device is  smart  and can slowly self-adjusts to assist breathing. Like CPAP, the device keeps a record of how well you are treated.  Your mask is your connection to the device. You get to choose what feels most comfortable and the staff will help to make sure if fits. Here: are some examples of the different masks that are available:       Key points to remember on your journey with sleep apnea:  1. Sleep study.  PAP devices often need to be adjusted during a sleep study to show that they are effective and adjusted right.  2. Good tips to remember: Try wearing just the mask during a quiet time during the day so your body adapts to wearing it. A humidifier is recommended for comfort in most cases to prevent drying of your nose and throat. Allergy medication from your provider may help you if you are having nasal congestion.  3. Getting settled-in. It takes more than one night for most of us to get used to wearing a mask. Try wearing just the mask during a quiet time during the day so your body adapts  to wearing it. A humidifier is recommended for comfort in most cases. Our team will work with you carefully on the first day and will be in contact within 4 days and again at 2 and 4 weeks for advice and remote device adjustments. Your therapy is evaluated by the device each day.   4. Use it every night. The more you are able to sleep naturally for 7-8 hours, the more likely you will have good sleep and to prevent health risks or symptoms from sleep apnea. Even if you use it 4 hours it helps. Occasionally all of us are unable to use a medical therapy, in sleep apnea, it is not dangerous to miss one night.   5. Communicate. Call our skilled team on the number provided on the first day if your visit for problems that make it difficult to wear the device. Over 2 out of 3 patients can learn to wear the device long-term with help from our team. Remember to call our team or your sleep providers if you are unable to wear the device as we may have other solutions for those who cannot adapt to mask CPAP therapy. It is recommended that you sleep your sleep provider within the first 3 months and yearly after that if you are not having problems.   6. Use it for your health. We encourage use of CPAP masks during daytime quiet periods to allow your face and brain to adapt to the sensation of CPAP so that it will be a more natural sensation to awaken to at night or during naps. This can be very useful during the first few weeks or months of adapting to CPAP though it does not help medically to wear CPAP during wakefulness and  should not be used as a strategy just to meet guidelines.  7. Take care of your equipment. Make sure you clean your mask and tubing using directions every day and that your filter and mask are replaced as recommended or if they are not working.     BESIDES CPAP, WHAT OTHER THERAPIES ARE THERE?  Positioning Device  Positioning devices are generally used when sleep apnea is mild and only occurs on your  back.This example shows a pillow that straps around the waist. It may be appropriate for those whose sleep study shows milder sleep apnea that occurs primarily when lying flat on one's back. Preliminary studies have shown benefit but effectiveness at home may need to be verified by a home sleep test. These devices are generally not covered by medical insurance.    For less expensive options:         [employer] or [employer] for slumber bump pillow, or backpack w/ chest strap stuffed w/ pillow;  or t shirt w/2 pockets, sew in tennis balls  Positioning Device  Positioning devices are generally used when sleep apnea is mild and only occurs on your back.This example shows a pillow that straps around the waist. It may be appropriate for those whose sleep study shows milder sleep apnea that occurs primarily when lying flat on one's back. Preliminary studies have shown benefit but effectiveness at home may need to be verified by a home sleep test. These devices are generally not covered by medical insurance.  Examples of devices that maintain sleeping on the back to prevent snoring and mild sleep apnea.    Belt type body positioner  Http://[employer]/    Electronic reminder  Http://nightshifttherapy.com/  Http://[email or web address].viVood.au/      Oral Appliance  What is oral appliance therapy?  An oral appliance device fits on your teeth at night like a retainer used after having braces. The device is made by a specialized dentist and requires several visits over 1-2 months before a manufactured device is made to fit your teeth and is adjusted to prevent your sleep apnea. Once an oral device is working properly, snoring should be improved. A home sleep test may be recommended at that time if to determine whether the sleep apnea is adequately treated.       Some things to remember:  -Oral devices are often, but not always, covered by your medical insurance. Be sure to check with your insurance provider.   -If you are referred for oral  therapy, you will be given a list of specialized dentists to consider or you may choose to visit the Web site of the American Academy of Dental Sleep Medicine  -Oral devices are less likely to work if you have severe sleep apnea or are extremely overweight.     More detailed information  An oral appliance is a small acrylic device that fits over the upper and lower teeth  (similar to a retainer or a mouth guard). This device slightly moves jaw forward, which moves the base of the tongue forward, opens the airway, improves breathing for effective treat snoring and obstructive sleep apnea in perhaps 7 out of 10 people .  The best working devices are custom-made by a dental device  after a mold is made of the teeth 1, 2, 3.  When is an oral appliance indicated?  Oral appliance therapy is recommended as a first-line treatment for patients with primary snoring, mild sleep apnea, and for patients with moderate sleep apnea who prefer appliance therapy to use of CPAP4, 5. Severity of sleep apnea is determined by sleep testing and is based on the number of respiratory events per hour of sleep.   How successful is oral appliance therapy?  The success rate of oral appliance therapy in patients with mild sleep apnea is 75-80% while in patients with moderate sleep apnea it is 50-70%. The chance of success in patients with severe sleep apnea is 40-50%. The research also shows that oral appliances have a beneficial effect on the cardiovascular health of TIANNA patients at the same magnitude as CPAP therapy7.  Oral appliances should be a second-line treatment in cases of severe sleep apnea, but if not completely successful then a combination therapy utilizing CPAP plus oral appliance therapy may be effective. Oral appliances tend to be effective in a broad range of patients although studies show that the patients who have the highest success are females, younger patients, those with milder disease, and less severe obesity.  3, 6.   Finding a dentist that practices dental sleep medicine  Specific training is available through the American Academy of Dental Sleep Medicine for dentists interested in working in the field of sleep. To find a dentist who is educated in the field of sleep and the use of oral appliances, near you, visit the Web site of the American Academy of Dental Sleep Medicine.    References  1. Alex, et al. Objectively measured vs self-reported compliance during oral appliance therapy for sleep-disordered breathing. Chest 2013; 144(5): 4117-4333.  2. Lauren et al. Objective measurement of compliance during oral appliance therapy for sleep-disordered breathing. Thorax 2013; 68(1): 91-96.  3. Soraida et al. Mandibular advancement devices in 620 men and women with TIANNA and snoring: tolerability and predictors of treatment success. Chest 2004; 125: 0439-9920.  4. Alexander et al. Oral appliances for snoring and TIANNA: a review. Sleep 2006; 29: 244-262.  5. Lauren et al. Oral appliance treatment for TIANNA: an update. J Clin Sleep Med 2014; 10(2): 215-227.  6. Adan et al. Predictors of OSAH treatment outcome. J Dent Res 2007; 86: 7306-7441.      Weight Loss: 15-30#    Weight loss is a long-term strategy that may improve sleep apnea in some patients.    Weight management is a personal decision and the decision should be based on your interest and the potential benefits.  If you are interested in exploring weight loss strategies, the following discussion covers the impact on weight loss on sleep apnea and the approaches that may be successful.    Being overweight does not necessarily mean you will have health consequences.  Those who have BMI over 35 or over 27 with existing medical conditions carries greater risk.   Weight loss decreases severity of sleep apnea in most people with obesity. For those with mild obesity who have developed snoring with weight gain, even 15-30 pound weight loss can improve and  occasionally eliminate sleep apnea.  Structured and life-long dietary and health habits are necessary to lose weight and keep healthier weight levels.     Though there may be significant health benefits from weight loss, long-term weight loss is very difficult to achieve- studies show success with dietary management in less than 10% of people. In addition, substantial weight loss may require years of dietary control and may be difficult if patients have severe obesity. In these cases, surgical management may be considered.  Finally, older individuals who have tolerated obesity without health complications may be less likely to benefit from weight loss strategies.      [unfilled]    Surgery:    Surgery for obstructive sleep apnea is considered generally only when other therapies fail to work. Surgery may be discussed with you if you are having a difficult time tolerating CPAP and or when there is an abnormal structure that requires surgical correction.  Nose and throat surgeries often enlarge the airway to prevent collapse.  Most of these surgeries create pain for 1-2 weeks and up to half of the most common surgeries are not effective throughout life.  You should carefully discuss the benefits and drawbacks to surgery with your sleep provider and surgeon to determine if it is the best solution for you.   More information  Surgery for TIANNA is directed at areas that are responsible for narrowing or complete obstruction of the airway during sleep.  There are a wide range of procedures available to enlarge and/or stabilize the airway to prevent blockage of breathing in the three major areas where it can occur: the palate, tongue, and nasal regions.  Successful surgical treatment depends on the accurate identification of the factors responsible for obstructive sleep apnea in each person.  A personalized approach is required because there is no single treatment that works well for everyone.  Because of anatomic variation,  consultation with an examination by a sleep surgeon is a critical first step in determining what surgical options are best for each patient.  In some cases, examination during sedation may be recommended in order to guide the selection of procedures.  Patients will be counseled about risks and benefits as well as the typical recovery course after surgery. Surgery is typically not a cure for a person s TIANNA.  However, surgery will often significantly improve one s TIANNA severity (termed  success rate ).  Even in the absence of a cure, surgery will decrease the cardiovascular risk associated with OSA7; improve overall quality of life8 (sleepiness, functionality, sleep quality, etc).      Palate Procedures:  Patients with TIANNA often have narrowing of their airway in the region of their tonsils and uvula.  The goals of palate procedures are to widen the airway in this region as well as to help the tissues resist collapse.  Modern palate procedure techniques focus on tissue conservation and soft tissue rearrangement, rather than tissue removal.  Often the uvula is preserved in this procedure. Residual sleep apnea is common in patient after pharyngoplasty with an average reduction in sleep apnea events of 33%2.      Tongue Procedures:  ExamWhile patients are awake, the muscles that surround the throat are active and keep this region open for breathing. These muscles relax during sleep, allowing the tongue and other structures to collapse and block breathing.  There are several different tongue procedures available.  Selection of a tongue base procedure depends on characteristics seen on physical exam.  Generally, procedures are aimed at removing bulky tissues in this area or preventing the back of the tongue from falling back during sleep.  Success rates for tongue surgery range from 50-62%3.    Hypoglossal Nerve Stimulation:  Hypoglossal nerve stimulation has recently received approval from the [place] Food and Drug  Administration for the treatment of obstructive sleep apnea.  This is based on research showing that the system was safe and effective in treating sleep apnea6.  Results showed that the median AHI score decreased 68%, from 29.3 to 9.0. This therapy uses an implant system that senses breathing patterns and delivers mild stimulation to airway muscles, which keeps the airway open during sleep.  The system consists of three fully implanted components: a small generator (similar in size to a pacemaker), a breathing sensor, and a stimulation lead.  Using a small handheld remote, a patient turns the therapy on before bed and off upon awakening.    Candidates for this device must be greater than 22 years of age, have moderate to severe TIANNA (AHI between 20-65), BMI less than 32, have tried CPAP/oral appliance without success, and have appropriate upper airway anatomy (determined by a sleep endoscopy performed by Dr. Mendiola).    Hypoglossal Nerve Stimulation Pathway:    The sleep surgeon s office will work with the patient through the insurance prior-authorization process (including communications and appeals).    Nasal Procedures:  Nasal obstruction can interfere with nasal breathing during the day and night.  Studies have shown that relief of nasal obstruction can improve the ability of some patients to tolerate positive airway pressure therapy for obstructive sleep apnea1.  Treatment options include medications such as nasal saline, topical corticosteroid and antihistamine sprays, and oral medications such as antihistamines or decongestants. Non-surgical treatments can include external nasal dilators for selected patients. If these are not successful by themselves, surgery can improve the nasal airway either alone or in combination with these other options.      Combination Procedures:  Combination of surgical procedures and other treatments may be recommended, particularly if patients have more than one area of narrowing or  persistent positional disease.  The success rate of combination surgery ranges from 66-80%2,3.    References  1. Emily GOMEZ. The Role of the Nose in Snoring and Obstructive Sleep Apnoea: An Update.  Eur Arch Otorhinolaryngol. 2011; 268: 1365-73.  2.  Sushila SM; Jasiel JA; Neyda JR; Pallanch JF; Gato MB; Liu SG; Sreekanth AREVALO. Surgical modifications of the upper airway for obstructive sleep apnea in adults: a systematic review and meta-analysis. SLEEP 2010;33(10):9345-8832. Rodo ORTIZ. Hypopharyngeal surgery in obstructive sleep apnea: an evidence-based medicine review.  Arch Otolaryngol Head Neck Surg. 2006 Feb;132(2):206-13.  3. Carlos YH1, Amelie Y, Amando CHE. The efficacy of anatomically based multilevel surgery for obstructive sleep apnea. Otolaryngol Head Neck Surg. 2003 Oct;129(4):327-35.  4. Rodo ORTIZ, Goldberg A. Hypopharyngeal Surgery in Obstructive Sleep Apnea: An Evidence-Based Medicine Review. Arch Otolaryngol Head Neck Surg. 2006 Feb;132(2):206-13.  5. Edino PJ et al. Upper-Airway Stimulation for Obstructive Sleep Apnea.  N Engl J Med. 2014 Jan 9;370(2):139-49.  6. Nacho Y et al. Increased Incidence of Cardiovascular Disease in Middle-aged Men with Obstructive Sleep Apnea. Am J Respir Crit Care Med; 2002 166: 159-165  7. Ball EM et al. Studying Life Effects and Effectiveness of Palatopharyngoplasty (SLEEP) study: Subjective Outcomes of Isolated Uvulopalatopharyngoplasty. Otolaryngol Head Neck Surg. 2011; 144: 623-631.              Your BMI is Body mass index is 34.03 kg/m .  Weight management is a personal decision.  If you are interested in exploring weight loss strategies, the following discussion covers the approaches that may be successful. Body mass index (BMI) is one way to tell whether you are at a healthy weight, overweight, or obese. It measures your weight in relation to your height.  A BMI of 18.5 to 24.9 is in the healthy range. A person with a BMI of 25 to 29.9 is considered  overweight, and someone with a BMI of 30 or greater is considered obese. More than two-thirds of American adults are considered overweight or obese.  Being overweight or obese increases the risk for further weight gain. Excess weight may lead to heart disease and diabetes.  Creating and following plans for healthy eating and physical activity may help you improve your health.  Weight control is part of healthy lifestyle and includes exercise, emotional health, and healthy eating habits. Careful eating habits lifelong are the mainstay of weight control. Though there are significant health benefits from weight loss, long-term weight loss with diet alone may be very difficult to achieve- studies show long-term success with dietary management in less than 10% of people. Attaining a healthy weight may be especially difficult to achieve in those with severe obesity. In some cases, medications, devices and surgical management might be considered.  What can you do?  If you are overweight or obese and are interested in methods for weight loss, you should discuss this with your provider.     Consider reducing daily calorie intake by 500 calories.     Keep a food journal.     Avoiding skipping meals, consider cutting portions instead.    Diet combined with exercise helps maintain muscle while optimizing fat loss. Strength training is particularly important for building and maintaining muscle mass. Exercise helps reduce stress, increase energy, and improves fitness. Increasing exercise without diet control, however, may not burn enough calories to loose weight.       Start walking three days a week 10-20 minutes at a time    Work towards walking thirty minutes five days a week     Eventually, increase the speed of your walking for 1-2 minutes at time    In addition, we recommend that you review healthy lifestyles and methods for weight loss available through the National Institutes of Health patient information  sites:  http://win.niddk.nih.gov/publications/index.htm    And look into health and wellness programs that may be available through your health insurance provider, employer, local community center, or delfino club.    Weight management plan: Patient was referred to their PCP to discuss a diet and exercise plan.

## 2019-11-07 ENCOUNTER — OFFICE VISIT (OUTPATIENT)
Dept: SLEEP MEDICINE | Facility: CLINIC | Age: 39
End: 2019-11-07
Payer: COMMERCIAL

## 2019-11-07 DIAGNOSIS — R06.83 SNORING: Primary | ICD-10-CM

## 2019-11-07 PROCEDURE — G0399 HOME SLEEP TEST/TYPE 3 PORTA: HCPCS | Performed by: INTERNAL MEDICINE

## 2019-11-07 NOTE — PROGRESS NOTES
Pt is completing a home sleep test. Pt was instructed on how to put on the Noxturnal T3 device and associated equipment before going to bed and given the opportunity to practice putting it on before leaving the sleep center. Pt was reminded to bring the home sleep test kit back to the center tomorrow, at agreed upon time for download and reporting.     Timothy Javier  Sleep Clinic Specialist - [place]

## 2019-11-07 NOTE — PROGRESS NOTES
CC: Reuben Rios, who prefers to be called Reuben self refers for poor quality sleep    HPI:Symptoms: He states that he tosses and turns a lot during the night his wife reports sounds like he is choking at times he has loud snoring and he has awoken himself from sleep with snort or gasping arousals.  Witnessed apneas have been seen.  He does awaken with a dry throat, sleeps on back and slides in all symptoms are worse on the back.  No symptoms reflective of narcolepsy, RLS, or abnormal nighttime behaviors.    Schedule: Starts work at 7, admits to being in [employer]  Enters bed weekday 9 30-10- 11 PM (rarely), enters bed weekends 11 PM-12-1 AM (1 AM is rare.)  Lights out somewhere between 10-11 PM  Sleep latency 10 minutes  Exits bed weekday 5 AM to go to the gym, exists bed weekends 7:30 AM  Wakeups: 5 times a night for 5 minutes with each wake up  Return to sleep 5 minutes  Activities mid night with wakeups: Use the bathroom  cats or kids  Total sleep time: 6-7 hours on work nights, 6-1/2- 8 on weekends  1-1/2 days/week might have late 30 to 45-minute nap at the end of workday  Activities in bed reading sometimes from a book or with a phone or computer feels bluelight is still on.    Final Wakeup: without snooze    Studies:  No sleep:   No PFTs:   No Echo    Personal, social and Family hx:  lives with wife and 2 kids 5-1/2 and 7 years of age.  Works as a commercial  sleep environment can be disruptive of sleep from peds children and bed partner elbowing him to rollover.  Family history is positive for snoring substances include some alcohol occasionally on weekends he is usually has a last one between 10 and 11 PM, no use of other recreational drugs, no caffeine, does exercise Parshall Sleepiness Scale today 6/24 BABATUNDE scale is 7/28 denies problems with sleepiness affecting work, does admit that in the past sleepiness has affected her driving.  10 out of 30 days or more is  troubled by tiredness and fatigue, 0 out of 30 days mental-health not good.    ROS: 14 point, comprehensive ROS is completed and negative with exception of the following: Eyes some change in vision ear nose and throat does seem to have an outer ear infection currently cardiovascular occasional swelling in feet skin has 1 psoriasis-muscles and bones bed ankles surgical history vasectomy, Debra good total knee  Medical conditions mastoiditis    Allergies:    Allergies   Allergen Reactions     No Known Allergies        Medications:    No current outpatient medications on file.       Problem List:  Patient Active Problem List    Diagnosis Date Noted     Facial paralysis 03/09/2019     Priority: Medium        Past Medical/Surgical History:  No past medical history on file.  Past Surgical History:   Procedure Laterality Date     GENITOURINARY SURGERY  1/5/2015    Vasectomy     PE TUBES  3/9/2019     PICC INSERTION Left 03/11/2019    4Fr - 49cm (5cm external), basilic vein, low SVC       No family history on file.  Social History     Socioeconomic History     Marital status: Single     Spouse name: Not on file     Number of children: Not on file     Years of education: Not on file     Highest education level: Not on file   Occupational History     Not on file   Social Needs     Financial resource strain: Not on file     Food insecurity:     Worry: Not on file     Inability: Not on file     Transportation needs:     Medical: Not on file     Non-medical: Not on file   Tobacco Use     Smoking status: Never Smoker     Smokeless tobacco: Never Used   Substance and Sexual Activity     Alcohol use: Yes     Frequency: Never     Drug use: No     Sexual activity: Yes     Partners: Female     Birth control/protection: Male Surgical   Lifestyle     Physical activity:     Days per week: Not on file     Minutes per session: Not on file     Stress: Not on file   Relationships     Social connections:     Talks on phone: Not on file      "Gets together: Not on file     Attends Ep[employer] service: Not on file     Active member of club or organization: Not on file     Attends meetings of clubs or organizations: Not on file     Relationship status: Not on file     Intimate partner violence:     Fear of current or ex partner: Not on file     Emotionally abused: Not on file     Physically abused: Not on file     Forced sexual activity: Not on file   Other Topics Concern     Not on file   Social History Narrative     Not on file       Physical Examination:  Vitals: /77   Pulse 81   Resp 16   Ht 1.803 m (5' 11\")   Wt 110.7 kg (244 lb)   SpO2 97%   BMI 34.03 kg/m    BMI= Body mass index is 34.03 kg/m .    Neck Cir (cm): 41 cm    [place] Total Score 11/5/2019   Total score - [place] 6 ISI 7/28      Finding notes:  GENERAL APPEARANCE: healthy, alert and no distress  EYES: Eyes grossly normal to inspection  HENT: soft palate dependent, tongue base enlarged and nares with mild decrease in flow on L  NECK: thyroid normal to palpation  RESP: lungs clear to auscultation - no rales, rhonchi or wheezes  CV: regular rates and rhythm, normal S1 S2, no S3 or S4 and no murmur, click or rub  Extremities are without clubbing, edema  Musculoskeletal:Moves without difficulty  Mallampati Class: IV.  Tonsillar Stage: 1  hidden by pillars.  Dental: Dentition in good condition.  Good advancement of lower jaw without tmd    Assessment/Plan: It is my impression that Reuben, a 39-year-old male who has loud snoring witnessed apneas wakes up with snort or gasping arousals and symptoms have worsened over the past 2 years along with a stop bang score of 5/8 he has a moderate pretest probability for obstructive sleep apnea the following plan of care has been developed:  (Other sleep diagnosis which maybe impacting restorative nature of sleep or cause of fatigue: insufficient sleep with rise time for early am exercise)    1.  Snoring with symptoms listed above and assessment " plan: I have ordered an HST to occur in a routine fashion.  We have discussed study protocol, treatment options, and we will have a scheduled virtual visit to discuss treatment options.  He indicates that he is open to most.  2.  Circadian rhythm: Delayed sleep phase disorder.  He is insufficiently slept throughout the week with getting up in our early to work out throughout the workweek.  He does not see that he has much opportunity to do this at a later time in his day.  We will need to talk about this when he returns as to providing adequate sleep at this time in the morning may be reducing the restorative nature of his night's sleep whether apneas treated or not.  3.  Obesity: Playing a part in increased risk for sleep disordered breathing.  Weight loss likely would reduce severity of suspected sleep disordered breathing.      Time spent with patient is 60 minutes, of which >50% was spent in counseling, education and coordination of care.    CC: No ref. provider found    ...Scanned in sleep consult note reviewed:

## 2019-11-08 ENCOUNTER — DOCUMENTATION ONLY (OUTPATIENT)
Dept: SLEEP MEDICINE | Facility: CLINIC | Age: 39
End: 2019-11-08
Payer: COMMERCIAL

## 2019-11-08 NOTE — PROCEDURES
"HOME SLEEP STUDY INTERPRETATION    Patient: Reuben Wilder  MRN: [id number]  YOB: 1980  Study Date: 2019  Referring Provider: No Ref-Primary, Physician  Ordering Provider: Anamaria Dixon NP     Indications for Home Study: Reuben Wilder is a 39 year old male with a history of good general health who presents with symptoms suggestive of obstructive sleep apnea.    Estimated body mass index is 34.03 kg/m  as calculated from the following:    Height as of 19: 1.803 m (5' 11\").    Weight as of 19: 110.7 kg (244 lb).  Total score - [place]: 6 (2019  2:00 PM)  STOP-BAN/8    Data: A full night home sleep study was performed recording the standard physiologic parameters including body position, movement, sound, nasal pressure, thermal oral airflow, chest and abdominal movements with respiratory inductance plethysmography, and oxygen saturation by pulse oximetry. Pulse rate was estimated by oximetry recording. This study was considered adequate based on > 4 hours of quality oximetry and respiratory recording. As specified by the AASM Manual for the Scoring of Sleep and Associated events, version 2.3, Rule VIII.D 1B, 4% oxygen desaturation scoring for hypopneas is used as a standard of care on all home sleep apnea testing.    Analysis Time:  350 minutes    Respiration:   Sleep Associated Hypoxemia: sustained hypoxemia was present. Baseline oxygen saturation was 95%.  Time with saturation less than or equal to 88% was 12 minutes. The lowest oxygen saturation was 82%.   Snoring: Snoring was present.  Respiratory events: The home study revealed a presence of 160 obstructive apneas and 16 mixed and central apneas. There were 33 hypopneas resulting in a combined apnea/hypopnea index [AHI] of 35.8 events per hour.  AHI was 105.6 per hour supine, NA per hour prone, 0 per hour on left side, and 1 per hour on right side.   Pattern: Excluding events noted above, respiratory rate and pattern was " Normal.    Position: Percent of time spent: supine - 38%, prone - 0%, on left - 29%, on right - 32%.    Heart Rate: By pulse oximetry normal rate was noted.     Assessment:   Severe supine predominant obstructive sleep apnea.  Sleep associated hypoxemia was present.    Recommendations:  Consider auto-CPAP at 5-15 cmH2O, oral appliance therapy or positional therapy.  Suggest optimizing sleep hygiene and avoiding sleep deprivation.  Weight management.    Diagnosis Code(s): Obstructive Sleep Apnea G47.33, Hypoxemia G47.36    Orquidea Fields MD, November 8, 2019   Diplomate, American Board of Internal Medicine, Sleep Medicine

## 2019-11-08 NOTE — Clinical Note
HST has been downloaded and ready for scoring. Bedtime: 10:40 PMNo treatment was used with this study.Timothy [facility] [place]

## 2019-11-08 NOTE — PROGRESS NOTES
This HSAT was performed using a Noxturnal T3 device which recorded snore, sound, movement activity, body position, nasal pressure, oronasal thermal airflow, pulse, oximetry and both chest and abdominal respiratory effort. HSAT data was restricted to the time patient states they were in bed.     HSAT was scored using 1B 4% hypopnea rule.     HST AHI (Non-PAT): 35.8  Snoring was reported as loud.  Time with SpO2 below 89% was 12 minutes.   Overall signal quality was good     Pt will follow up with sleep provider to determine appropriate therapy.

## 2019-11-08 NOTE — PROGRESS NOTES
Pt returned HST device. It was downloaded and forwarded data to the clinical specialist for scoring.    Timothy Javier  [facility] [place]

## 2019-11-21 ENCOUNTER — VIRTUAL VISIT (OUTPATIENT)
Dept: SLEEP MEDICINE | Facility: CLINIC | Age: 39
End: 2019-11-21
Payer: COMMERCIAL

## 2019-11-21 DIAGNOSIS — G47.33 OSA (OBSTRUCTIVE SLEEP APNEA): Primary | ICD-10-CM

## 2019-11-21 PROCEDURE — 98967 PH1 ASSMT&MGMT NQHP 11-20: CPT | Performed by: NURSE PRACTITIONER

## 2019-11-21 NOTE — PROGRESS NOTES
"    CC: reviewed sleep study    HPI: 38 yo males with stop bang score of 5/8.   Snoring with symptoms listed above and assessment plan: I have ordered an HST to occur in a routine fashion.  We have discussed study protocol, treatment options, and we will have a scheduled virtual visit to discuss treatment options.  He indicates that he is open to most.  2.  Circadian rhythm: Delayed sleep phase disorder.  He is insufficiently slept throughout the week with getting up in our early to work out throughout the workweek.  He does not see that he has much opportunity to do this at a later time in his day.  We will need to talk about this when he returns as to providing adequate sleep at this time in the morning may be reducing the restorative nature of his night's sleep whether apneas treated or not.  3.  Obesity: Playing a part in increased risk for sleep disordered breathing.  Weight loss likely would reduce severity of suspected sleep disordered breathing.    HOME SLEEP STUDY INTERPRETATION    Patient: Reuben Wilder  MRN: [id number]  YOB: 1980  Study Date: 2019  Referring Provider: No Ref-Primary, Physician  Ordering Provider: Anamaria Dixon NP     Indications for Home Study: Reuben Wilder is a 39 year old male with a history of good general health who presents with symptoms suggestive of obstructive sleep apnea.    Estimated body mass index is 34.03 kg/m  as calculated from the following:    Height as of 19: 1.803 m (5' 11\").    Weight as of 19: 110.7 kg (244 lb).  Total score - [place]: 6 (2019  2:00 PM)  STOP-BAN/8    Data: A full night home sleep study was performed recording the standard physiologic parameters including body position, movement, sound, nasal pressure, thermal oral airflow, chest and abdominal movements with respiratory inductance plethysmography, and oxygen saturation by pulse oximetry. Pulse rate was estimated by oximetry recording. This study was considered " adequate based on > 4 hours of quality oximetry and respiratory recording. As specified by the AASM Manual for the Scoring of Sleep and Associated events, version 2.3, Rule VIII.D 1B, 4% oxygen desaturation scoring for hypopneas is used as a standard of care on all home sleep apnea testing.    Analysis Time:  350 minutes    Respiration:   Sleep Associated Hypoxemia: sustained hypoxemia was present. Baseline oxygen saturation was 95%.  Time with saturation less than or equal to 88% was 12 minutes. The lowest oxygen saturation was 82%.   Snoring: Snoring was present.  Respiratory events: The home study revealed a presence of 160 obstructive apneas and 16 mixed and central apneas. There were 33 hypopneas resulting in a combined apnea/hypopnea index [AHI] of 35.8 events per hour.  AHI was 105.6 per hour supine, NA per hour prone, 0 per hour on left side, and 1 per hour on right side.   Pattern: Excluding events noted above, respiratory rate and pattern was Normal.    Position: Percent of time spent: supine - 38%, prone - 0%, on left - 29%, on right - 32%.    Heart Rate: By pulse oximetry normal rate was noted.     Assessment:   Severe supine predominant obstructive sleep apnea.  Sleep associated hypoxemia was present.    Recommendations:  Consider auto-CPAP at 5-15 cmH2O, oral appliance therapy or positional therapy.  Suggest optimizing sleep hygiene and avoiding sleep deprivation.  Weight management.    Diagnosis Code(s): Obstructive Sleep Apnea G47.33, Hypoxemia G47.36  Treatment options discussed cpap, mad likely with need for positional device as well with strong positional component, positional device (snores on side as well)    Barriers to treatment: none    Preferences positional (may need mad with snoring), mad (may need positional), cpap, wt loss.      ASSESSMENT/PLAN:    1. TIANNA, severe, aware of need to treat to reverse risk of health outcomes    2. Obesity:34 bmi, weightloss also would likely reduce  severity    3.. Circadian rhythm likely impacting daytime function, will address if sleep deprivation continues to be issue.    Time spent with patient is 15 minutes with a scheduled telephone visit, of which >50% was spent in counseling, education and coordination of care.  .

## 2019-11-25 ENCOUNTER — TELEPHONE (OUTPATIENT)
Dept: SLEEP MEDICINE | Facility: CLINIC | Age: 39
End: 2019-11-25

## 2019-11-25 NOTE — TELEPHONE ENCOUNTER
Patient is anxious to receive his dme he will be going to [facility]. Please place orders. Patient wishes to have this done yet this week if possible.

## 2019-11-29 ENCOUNTER — DOCUMENTATION ONLY (OUTPATIENT)
Dept: SLEEP MEDICINE | Facility: CLINIC | Age: 39
End: 2019-11-29

## 2019-11-29 NOTE — PROGRESS NOTES
Patient was offered choice of vendor and chose [facility].  Patient Reuben SOLIS Raw was set up at [place] on November 29, 2019. Patient received a Resmed AirSense 10 Auto. Pressures were set at 5-15 cm H2O.   Patient s ramp is 5 cm H2O for Auto and FLEX/EPR is 2.  Patient received a Resmed Mask name: [id number]  Nasal mask size Medium, heated tubing and heated humidifier.  Patient is enrolled in the STM Program and does not need to meet compliance. Emy Manning

## 2019-12-02 ENCOUNTER — DOCUMENTATION ONLY (OUTPATIENT)
Dept: SLEEP MEDICINE | Facility: CLINIC | Age: 39
End: 2019-12-02

## 2019-12-02 NOTE — PROGRESS NOTES
3 DAY STM VISIT    Diagnostic AHI:  35.8 HST    Patient contacted for 3 day STM visit  Subjective measures:  Pt states things are going well and has no issues or complaints.  Pt is benefiting from therapy.    Replacement device: No  STM ordered by provider: Yes     Device type: Auto-CPAP  PAP settings from order::  CPAP min 5 cm  H20       CPAP max 15 cm  H20  Mask type:    Nasal Mask     Device settings from machine      Min CPAP 5.0            Max CPAP 15.0      Assessment: Nightly usage over four hours.  Action plan: Patient to have 14 day STM visit. Patient has a follow up visit scheduled:   No, will call back to schedule.    Total time spent on remote patient monitoring data analysis and patient contact today:   12 minutes

## 2019-12-16 ENCOUNTER — DOCUMENTATION ONLY (OUTPATIENT)
Dept: SLEEP MEDICINE | Facility: CLINIC | Age: 39
End: 2019-12-16

## 2019-12-16 NOTE — PROGRESS NOTES
14  DAY STM VISIT    Diagnostic AHI:   35.8 HST    Message left for patient to return call     Assessment: Pt meeting objective benchmarks.       Action plan: waiting for patient to return call.  and pt to have 30 day STM visit.      Device type: Auto-CPAP    PAP settings: CPAP min 5.0 cm  H20       CPAP max 15.0 cm  H20          95th% pressure 8.4 cm  H20     Mask type:  Nasal Mask    Objective measures: 14 day rolling measures      Compliance  100 %      Leak  2.16 lpm  last  upload      AHI 2.83   last  upload      Average number of minutes 395      Objective measure goal  Compliance   Goal >70%  Leak   Goal < 24 lpm  AHI  Goal < 5  Usage  Goal >240      Total time spent on accessing, reviewing and interpreting remote patient PAP therapy data:   10 minutes      Total time spent with direct patient communication :   1 minutes

## 2019-12-30 ENCOUNTER — DOCUMENTATION ONLY (OUTPATIENT)
Dept: SLEEP MEDICINE | Facility: CLINIC | Age: 39
End: 2019-12-30

## 2019-12-30 NOTE — PROGRESS NOTES
30 DAY STM VISIT    Diagnostic AHI:  35.8 HST    Subjective measures:   Patient doing great not sleepy in afternoon.     Assessment: Pt meeting objective benchmarks.  Patient meeting subjective benchmarks.   Action plan: pt to have 6 month STM visit  Patient has not scheduled a follow up visit.   Device type: Auto-CPAP  PAP settings: CPAP min 5.0 cm  H20     CPAP max 15.0 cm  H20    95th% pressure 8.4 cm  H20   Mask type:  Nasal Mask  Objective measures: 14 day rolling measures      Compliance  85 %      Leak  3.42 lpm  last  upload      AHI 1.89   last  upload      Average number of minutes 345      Objective measure goal  Compliance   Goal >70%  Leak   Goal < 24 lpm  AHI  Goal < 5  Usage  Goal >240         Total time spent on accessing, reviewing and interpreting remote patient PAP therapy data:   10 minutes      Total time spent with direct patient communication :   3 minutes    Total time spent on  remote patient monitoring analysis for last 30 days:   36 min

## 2020-04-14 ENCOUNTER — TELEPHONE (OUTPATIENT)
Dept: OTOLARYNGOLOGY | Facility: CLINIC | Age: 40
End: 2020-04-14

## 2020-04-17 ENCOUNTER — TELEPHONE (OUTPATIENT)
Dept: OTOLARYNGOLOGY | Facility: CLINIC | Age: 40
End: 2020-04-17

## 2020-04-17 NOTE — TELEPHONE ENCOUNTER
Spoke with patient regarding virtual visit with Dr. Villalta on 4/21/20. Confirmed patient's email address. Also verified patient's appointment time, and informed him/her that we would be calling again 10-15 minutes prior to the appointment. Patient expressed understanding. Will send email with virtual visit information.    Patient email: [email or web address]  Appointment date: 4/21/20  Appointment time: 8:00am  MyChart active? YES, information sent    Michell Santos LPN

## 2020-04-21 ENCOUNTER — TELEPHONE (OUTPATIENT)
Dept: OTOLARYNGOLOGY | Facility: CLINIC | Age: 40
End: 2020-04-21

## 2020-04-21 ENCOUNTER — VIRTUAL VISIT (OUTPATIENT)
Dept: OTOLARYNGOLOGY | Facility: CLINIC | Age: 40
End: 2020-04-21
Payer: COMMERCIAL

## 2020-04-21 VITALS — WEIGHT: 220 LBS | BODY MASS INDEX: 30.8 KG/M2 | HEIGHT: 71 IN

## 2020-04-21 DIAGNOSIS — H91.90 HEARING LOSS, UNSPECIFIED HEARING LOSS TYPE, UNSPECIFIED LATERALITY: Primary | ICD-10-CM

## 2020-04-21 ASSESSMENT — PAIN SCALES - GENERAL: PAINLEVEL: NO PAIN (0)

## 2020-04-21 ASSESSMENT — MIFFLIN-ST. JEOR: SCORE: 1935.04

## 2020-04-21 NOTE — TELEPHONE ENCOUNTER
Called patient to schedule 3 month f/u with Dr. Villalta, with a WIN prior. Left a VM with scheduling instructions and ENT call center number.

## 2020-04-21 NOTE — PROGRESS NOTES
"Reuben Wilder is a 40 year old male who is being evaluated via a billable video visit.      The patient has been notified of following:     \"This video visit will be conducted via a call between you and your physician/provider. We have found that certain health care needs can be provided without the need for an in-person physical exam.  This service lets us provide the care you need with a video conversation.  If a prescription is necessary we can send it directly to your pharmacy.  If lab work is needed we can place an order for that and you can then stop by our lab to have the test done at a later time.    Video visits are billed at different rates depending on your insurance coverage.  Please reach out to your insurance provider with any questions.    If during the course of the call the physician/provider feels a video visit is not appropriate, you will not be charged for this service.\"    Patient has given verbal consent for Video visit? Yes    How would you like to obtain your AVS? Alivia    Patient would like the video invitation sent by: Send to e-mail at: [email or web address]    Video Start Time: 7:53 AM    Additional provider notes:     Reuben Wilder is a 40 year old man who developed right mastoiditis and facial paresis in 2019, successfully treated with a PE tube, antibiotics, and steroids.  A number of months later, he developed OE that responded to drops. He was last seen in 10/2019.    He reports today that he has been doing well.  He has not had any recurrence of facial paralysis.  His ear is not draining.  Hearing seems normal.  No ear pain.  Every once in a while there is a little twinge in his ear.   No more OE episodes.   But he did go to an urgent care for a sore throat in January. They examined his ear and noted that his tube is out. This fits with his experience as he can no longer blow air out through his ear.    10 point ROS was negative except those things noted above.    Physical " examination:  General appearance: He was seated, well-groomed, and in no acute distress  Skin: Healthy in appearance with no facial lesions  Psychiatric: Affect was appropriate, speech normal  Neurologic: Facial nerve function is intact.  He has full activation of all branches bilaterally with no discernible asymmetry.  Ears: Auricles viewed and no evidence of skin lesions or drainage.  Respiratory: He is breathing comfortably without stridor or stertor or coughing.    Impression and Plan:  Reuben Wilder is a 40 year old man who has a history of right facial paresis from otitis media with mastoiditis.  He has fully recovered from this and has had no further related symptoms.  Based on his trip to the urgent care earlier this year for an unrelated matter, his pressure equalization tube is reported to be extruded.    We talked about the pros and cons of replacing the tube.  Given that this was a one-time event in his life and he has no prior history of recurrent ear infections, it is reasonable to take a watchful waiting approach for now.  He understands that if he develops any ear fullness or symptoms of otitis media, that we would want to treat it fairly aggressively and that he should contact us and/or be seen by a physician quickly.    Next steps:  We should examine his ear to ensure that the tympanic membrane healed appropriately after the tube extruded.  We talked about the small risk of incomplete healing following tube extrusion, and small risk of squamous ingrowth or other abnormalities which make a visit wise.  This visit should be scheduled with an audiogram and performed in the post COVID era.  He will await scheduling accordingly.        Video-Visit Details    Type of service:  Video Visit    Video End Time (time video stopped): 8:07 AM      Originating Location (pt. Location): Home    Distant Location (provider location):  [facility] EAR NOSE AND THROAT     Mode of Communication:  Video Conference via  Mian Villalta MD

## 2020-04-21 NOTE — PATIENT INSTRUCTIONS
1. You were seen in the ENT Clinic today by .  If you have any questions or concerns after your appointment, please call   - Option 1: ENT Clinic: [phone]  - Option 2: Wilma (' Nurse): [phone]    2.   Plan to return to clinic in 3 months with a new hearing test.     REECE Dillon  [facility] Otolaryngology  [phone]

## 2020-07-21 ENCOUNTER — OFFICE VISIT (OUTPATIENT)
Dept: OTOLARYNGOLOGY | Facility: CLINIC | Age: 40
End: 2020-07-21
Payer: COMMERCIAL

## 2020-07-21 VITALS — OXYGEN SATURATION: 98 % | TEMPERATURE: 97.7 F | WEIGHT: 245 LBS | BODY MASS INDEX: 34.17 KG/M2 | HEART RATE: 72 BPM

## 2020-07-21 DIAGNOSIS — H70.091: ICD-10-CM

## 2020-07-21 DIAGNOSIS — G51.0 FACIAL NERVE PARESIS: Primary | ICD-10-CM

## 2020-07-21 ASSESSMENT — PAIN SCALES - GENERAL: PAINLEVEL: NO PAIN (0)

## 2020-07-21 NOTE — LETTER
7/21/2020       RE: Reuben Wilder  [address] [address]     Dear Colleague,    Thank you for referring your patient, Reuben Wilder, to the [facility] EAR NOSE AND THROAT at [facility]. Please see a copy of my visit note below.    Reuben Wilder is seen for follow up for right mastoiditis with facial paresis in 2019, later followed by OE that responded to ear drops. Patient facial paresis resolved shortly after starting abx in 2019 and has no issues with facial expression since then. We last saw him via video chat 4/21/2020 and at that time he stated that an urgent care provider informed him that his PE tube had extruded.    Today he endorses right sided hearing is normal. He denies otalgia, otorrhea, aural fullness, dizziness, fever/chills/night sweats.       Review of systems:  10 point review of systems negative unless stated above     Physical examination:  male in no acute distress.  Alert and answering questions appropriately.  HB 1/6 bilaterally.  Bilateral ears examined under the microscope.    Right ear - auricle and pinna without pain to palpation, external skin healthy, EAC with PE tube (white) seated in ear canal, PE tube removed with alligator forceps, EAC without otorrhea/lesions, TM intact without perforations/retractions/monomeric areas, middle ear aerated.    Left ear - auricle and pinna without pain to palpation, external skin healthy,  EAC without otorrhea/lesions, TM intact without perforations/retractions/monomeric areas, middle ear aerated.    Audiogram:  No recent audiogram     Assessment and plan:  Reuben Wilder is seen for follow up for right mastoiditis with facial paresis in 2019, later followed by OE that responded to ear drops. He has no otologic, vestibular, or facial nerve concerns. His right PE tube has extruded and right TM is healthy. There are no concerns for middle ear disease. Discussed with patient importance of early  identification and treatment for otologic infections.     - Follow up prn    Desi Lopez MD  Resident    Marlen Villalta MD  Otology & Neurotology  [facility]    I, Marlen Villalta MD, saw this patient with the resident/fellow and agree with the resident s findings and plan of care as documented in the resident s/fellow s note. I was present for the entire procedure.      Again, thank you for allowing me to participate in the care of your patient.      Sincerely,    Marlen Villalta MD

## 2020-07-21 NOTE — PATIENT INSTRUCTIONS
1. You were seen in the ENT Clinic today by .  If you have any questions or concerns after your appointment, please call   - Option 1: ENT Clinic: [phone]  - Option 2: Wilma (' Nurse): [phone]    2.   Plan to return to clinic as needed.     REECE Dillon  Care Coordinator  [facility] Otolaryngology  [phone]

## 2020-07-21 NOTE — PROGRESS NOTES
Reuben Wilder is seen for follow up for right mastoiditis with facial paresis in 2019, later followed by OE that responded to ear drops. Patient facial paresis resolved shortly after starting abx in 2019 and has no issues with facial expression since then. We last saw him via video chat 4/21/2020 and at that time he stated that an urgent care provider informed him that his PE tube had extruded.    Today he endorses right sided hearing is normal. He denies otalgia, otorrhea, aural fullness, dizziness, fever/chills/night sweats.       Review of systems:  10 point review of systems negative unless stated above     Physical examination:  male in no acute distress.  Alert and answering questions appropriately.  HB 1/6 bilaterally.  Bilateral ears examined under the microscope.    Right ear - auricle and pinna without pain to palpation, external skin healthy, EAC with PE tube (white) seated in ear canal, PE tube removed with alligator forceps, EAC without otorrhea/lesions, TM intact without perforations/retractions/monomeric areas, middle ear aerated.    Left ear - auricle and pinna without pain to palpation, external skin healthy,  EAC without otorrhea/lesions, TM intact without perforations/retractions/monomeric areas, middle ear aerated.    Audiogram:  No recent audiogram     Assessment and plan:  Reuben Wilder is seen for follow up for right mastoiditis with facial paresis in 2019, later followed by OE that responded to ear drops. He has no otologic, vestibular, or facial nerve concerns. His right PE tube has extruded and right TM is healthy. There are no concerns for middle ear disease. Discussed with patient importance of early identification and treatment for otologic infections.     - Follow up juliette Lopez MD  Resident    Marlen Villalta MD  Otology & Neurotology  [facility]    I, Marlen Villalta MD, saw this patient with the resident/fellow and agree with the resident s findings and plan of  care as documented in the resident s/fellow s note. I was present for the entire procedure.

## 2020-07-21 NOTE — LETTER
Date:July 28, 2020      Patient was self referred, no letter generated. Do not send.        [facility] Information

## 2020-07-21 NOTE — NURSING NOTE
Chief Complaint   Patient presents with     RECHECK     Physical exam         Pulse 72, temperature 97.7  F (36.5  C), temperature source Temporal, weight 111.1 kg (245 lb), SpO2 98 %.    Emy Nathan, EMT

## 2020-11-16 ENCOUNTER — HEALTH MAINTENANCE LETTER (OUTPATIENT)
Age: 40
End: 2020-11-16

## 2021-09-18 ENCOUNTER — HEALTH MAINTENANCE LETTER (OUTPATIENT)
Age: 41
End: 2021-09-18

## 2021-11-08 ENCOUNTER — TELEPHONE (OUTPATIENT)
Dept: OTOLARYNGOLOGY | Facility: CLINIC | Age: 41
End: 2021-11-08

## 2021-11-08 RX ORDER — BENZONATATE 100 MG/1
CAPSULE ORAL
COMMUNITY
Start: 2021-11-04 | End: 2023-10-18

## 2021-11-08 RX ORDER — AMOXICILLIN 500 MG/1
CAPSULE ORAL
COMMUNITY
Start: 2021-11-04 | End: 2023-10-18

## 2021-11-08 NOTE — TELEPHONE ENCOUNTER
Pt started to get a dry cough on Tues and on Wed he developed pressure in his ears that goes down to his neck. It is in front of the ear and below. Thurs he started Amoxicillin. He was also given tessalon pearls and Flonase which he has not been used. He flew out Thursday and flew back yesterday. He has no drainage. His ears are not popping and he has a nagging cough that is not productive. His covid test was negative and he denies any fevers.     Spoke to Rylee who recommended him start with is PCP since it is probably cold related. If needed they will refer on to us.     Michell Santos LPN

## 2021-11-08 NOTE — TELEPHONE ENCOUNTER
[facility] Call Center    Phone Message    May a detailed message be left on voicemail: yes     Reason for Call: Symptoms or Concerns     If patient has red-flag symptoms, warm transfer to triage line    Current symptom or concern: Rt ear infection, Pain and Pressure    Symptoms have been present for:  5 day(s)    Has patient previously been seen for this? Yes    By : Dr Villalta    Date: 7/21/2020    Are there any new or worsening symptoms? Yes      Action Taken: Message routed to:  [facility] ENT    Travel Screening: Not Applicable

## 2021-11-08 NOTE — TELEPHONE ENCOUNTER
Spoke to the pt and directed him to his PCP or UC to be accessed. No further questions.    Michell Santos LPN

## 2022-01-08 ENCOUNTER — HEALTH MAINTENANCE LETTER (OUTPATIENT)
Age: 42
End: 2022-01-08

## 2022-04-21 ENCOUNTER — VIRTUAL VISIT (OUTPATIENT)
Dept: SLEEP MEDICINE | Facility: CLINIC | Age: 42
End: 2022-04-21
Payer: COMMERCIAL

## 2022-04-21 VITALS — HEIGHT: 72 IN | BODY MASS INDEX: 29.8 KG/M2 | WEIGHT: 220 LBS

## 2022-04-21 DIAGNOSIS — G47.33 OSA (OBSTRUCTIVE SLEEP APNEA): Primary | ICD-10-CM

## 2022-04-21 PROCEDURE — 99213 OFFICE O/P EST LOW 20 MIN: CPT | Mod: GT | Performed by: FAMILY MEDICINE

## 2022-04-21 ASSESSMENT — SLEEP AND FATIGUE QUESTIONNAIRES
HOW LIKELY ARE YOU TO NOD OFF OR FALL ASLEEP WHILE SITTING AND READING: SLIGHT CHANCE OF DOZING
HOW LIKELY ARE YOU TO NOD OFF OR FALL ASLEEP WHILE WATCHING TV: SLIGHT CHANCE OF DOZING
HOW LIKELY ARE YOU TO NOD OFF OR FALL ASLEEP WHILE SITTING QUIETLY AFTER LUNCH WITHOUT ALCOHOL: SLIGHT CHANCE OF DOZING
HOW LIKELY ARE YOU TO NOD OFF OR FALL ASLEEP WHILE SITTING AND TALKING TO SOMEONE: WOULD NEVER DOZE
HOW LIKELY ARE YOU TO NOD OFF OR FALL ASLEEP IN A CAR, WHILE STOPPED FOR A FEW MINUTES IN TRAFFIC: WOULD NEVER DOZE
HOW LIKELY ARE YOU TO NOD OFF OR FALL ASLEEP WHILE LYING DOWN TO REST IN THE AFTERNOON WHEN CIRCUMSTANCES PERMIT: SLIGHT CHANCE OF DOZING
HOW LIKELY ARE YOU TO NOD OFF OR FALL ASLEEP WHILE SITTING INACTIVE IN A PUBLIC PLACE: WOULD NEVER DOZE
HOW LIKELY ARE YOU TO NOD OFF OR FALL ASLEEP WHEN YOU ARE A PASSENGER IN A CAR FOR AN HOUR WITHOUT A BREAK: WOULD NEVER DOZE

## 2022-04-21 NOTE — PROGRESS NOTES
"Reuben Wilder is a 42 year old male who is being evaluated via a billable video visit.       The patient has been notified of following:      \"This video visit will be conducted via a call between you and your physician/provider. We have found that certain health care needs can be provided without the need for an in-person physical exam.  This service lets us provide the care you need with a video conversation.  If a prescription is necessary we can send it directly to your pharmacy.  If lab work is needed we can place an order for that and you can then stop by our lab to have the test done at a later time.     Video visits are billed at different rates depending on your insurance coverage.  Please reach out to your insurance provider with any questions.     If during the course of the call the physician/provider feels a video visit is not appropriate, you will not be charged for this service.\"     Patient has given verbal consent for Video visit? Yes  How would you like to obtain your AVS? Mail a copy  If you are dropped from the video visit, the video invite should be resent to: Text to cell phone: -  Will anyone else be joining your video visit? No  If patient encounters technical issues they should call [phone]      Video-Visit Details     Type of service:  Video Visit     Video Start Time: 2pm  Video End Time: 2:15pm    Originating Location (pt. Location): Home     Distant Location (provider location):  [facility]      Platform used for Video Visit: [employer]    Virtual visit for annual follow-up of severe TIANNA managed with CPAP.     Assessment:  - Severe TIANNA  - Appears adequately controlled with CPAP auto-titrate 5-15 cm H2O, though compliance below goal  -Patient reporting that he is doing very well with CPAP and notes significant daytime benefit    Plan:  -Plan to continue CPAP auto titrate 5-15 cm H2O, recommended average nightly usage of 7 or more hours.  - Placed orders for " new supplies.    SUBJECTIVE:  Reuben Wilder is a 42 year old male.    Prior Sleep Testin2019 - HST with weight 244 lbs, BMI 34.  AHI 35.8 with strong positional component (AHI was 105.6 per hour supine, NA per hour prone, 0 per hour on left side, and 1 per hour on right side).  Baseline oxygen saturation was 95%.  Time with saturation less than or equal to 88% was 12 minutes. The lowest oxygen saturation was 82%    2019 - Visit with Anamaria Escoto to review HST.  Plan to start CPAP, presumed auto-titrate 5-15.    Today -he presents for annual follow-up.  He denies any sleep concerns, he feels that he is sleeping very well and that his CPAP is doing very well.  He denies difficulty falling asleep or staying asleep.  He feels well rested.  His main reason for this visit today is to get an updated supply prescription, where he is in need of replacing his mask and hose.    CPAP download reviewed over past 30 days on auto-titrate 5-15 cm H2O.  Average daily usage 311 minutes.  Pressure 95th%ile 8.8 cm H2O.  AHI 1.      Past medical history:    Patient Active Problem List    Diagnosis Date Noted     Facial paralysis 2019     Priority: Medium       10 point ROS of systems including Constitutional, Eyes, Respiratory, Cardiovascular, Gastroenterology, Genitourinary, Integumentary, Muscularskeletal, Psychiatric were all negative except for pertinent positives noted in my HPI.    Current Outpatient Medications   Medication Sig Dispense Refill     amoxicillin (AMOXIL) 500 MG capsule        benzonatate (TESSALON) 100 MG capsule          OBJECTIVE:  There were no vitals taken for this visit.    Physical Exam     ---  This note was written with the assistance of the Dragon voice-dictation technology software. The final document, although reviewed, may contain errors. For corrections, please contact the office.    Naeem Malone MD    Sleep Medicine  [facility]   ([phone])  [facility]  ([phone])    Time spent on the date of service:  20 minutes.

## 2022-04-21 NOTE — PROGRESS NOTES
"Reuben is a 42 year old who is being evaluated via a billable video visit.      How would you like to obtain your AVS? MyChart  If the video visit is dropped, the invitation should be resent by: Text to cell phone: [phone]  Will anyone else be joining your video visit? No  {If patient encounters technical issues they should call [phone] :[id number]}    Video Start Time: {video visit start/end time for provider to select:152948}  Video-Visit Details    Type of service:  Video Visit    Video End Time:{video visit start/end time for provider to select:152948}    Originating Location (pt. Location): {video visit patient location:025662::\"Home\"}    Distant Location (provider location):  [facility]     Platform used for Video Visit: {Virtual Visit Platforms:857483::\"[employer]\"}  "

## 2022-05-03 NOTE — NURSING NOTE
1 year appointment reminder message was created and will be sent out 2 - 3 months prior to next appointment due date. Roger Young, [facility]

## 2022-09-26 ENCOUNTER — TELEPHONE (OUTPATIENT)
Dept: OTOLARYNGOLOGY | Facility: CLINIC | Age: 42
End: 2022-09-26

## 2022-09-26 NOTE — TELEPHONE ENCOUNTER
Called pt to get more information- Pt denies fevers, drainage, pain or pressure in the ear (just in front and behind it), and any recent colds. Is worried he has mastoiditis again. All of the swelling just started possibly yesterday.    Michell Santos LPN

## 2022-09-26 NOTE — TELEPHONE ENCOUNTER
[facility] Call Center    Phone Message    May a detailed message be left on voicemail: yes     Reason for Call: Symptoms or Concerns     If patient has red-flag symptoms, warm transfer to triage line    Current symptom or concern: Swelling behind the left ear. Painful to touch. Swelling is hard tot he touch. Between ear and temple is sore to the touch. Neck is sore between shoulder blades. Pt concerned that this is a repeat of Mastoiditis.     Symptoms have been present for:  1 day(s)    Has patient previously been seen for this? Yes    By: Dr. Villalta    Date: 07/21/2020    Are there any new or worsening symptoms? Yes: New and worsening      Action Taken: Message routed to:  [facility] ENT    Travel Screening: Not Applicable

## 2022-09-28 NOTE — TELEPHONE ENCOUNTER
Per Dr. Villalta, pt should get checked out with his PCP or UC as some of the symptoms are not relating to mastoditis. He says he is feeling alittle better today. He will reach out to get checked out and if there is any ear concerns our Nurse Practitioner Jessica can see the pt. Pt agreed the plan.    Michell Santos LPN

## 2022-11-20 ENCOUNTER — HEALTH MAINTENANCE LETTER (OUTPATIENT)
Age: 42
End: 2022-11-20

## 2023-04-15 ENCOUNTER — HEALTH MAINTENANCE LETTER (OUTPATIENT)
Age: 43
End: 2023-04-15

## 2023-10-18 ENCOUNTER — TELEPHONE (OUTPATIENT)
Dept: SURGERY | Facility: CLINIC | Age: 43
End: 2023-10-18
Payer: COMMERCIAL

## 2023-10-18 NOTE — TELEPHONE ENCOUNTER
Mr. Wilder called this evening with concern for mastoiditis. He has a history of mastoiditis that reportedly led to facial paralysis. He reports feeling lots of pressure on the right side of his face today, but still currently has facial movement. A couple weeks ago he had an ear infection that was treated with drops and did not improve. It was then treated with oral antibiotics and has improved since then. Over the past few days he feels some pain below his jaw and up in his temple. He saw a physician yesterday and tested negative for strep and covid. They looked in the ears and said there was no effusion or swelling. He denies fevers, any skin changes or erythema, pain with swallowing, shortness of breath, pain to palpation over his neck, face, ear, and posterior to his ear. His symptoms seem to have improved with sudafed, advil and Afrin. He was encouraged to continue the sudafed and Advil that he took before due to his success. He stated that his symptoms have been stable and are not worsening since yesterday. His current symptoms are not consistent with mastoiditis, but if they worsen - he has fevers, skin changes, ear pain/infection, tenderness to palpation, etc., he should come to the emergency room.     Nivia King  Otolaryngology PGY 1

## 2024-06-16 ENCOUNTER — HEALTH MAINTENANCE LETTER (OUTPATIENT)
Age: 44
End: 2024-06-16